# Patient Record
Sex: MALE | Race: WHITE | Employment: FULL TIME | URBAN - METROPOLITAN AREA
[De-identification: names, ages, dates, MRNs, and addresses within clinical notes are randomized per-mention and may not be internally consistent; named-entity substitution may affect disease eponyms.]

---

## 2017-06-16 PROBLEM — Z96.652 STATUS POST TOTAL LEFT KNEE REPLACEMENT: Status: ACTIVE | Noted: 2017-06-16

## 2018-11-15 ENCOUNTER — HOSPITAL ENCOUNTER (OUTPATIENT)
Dept: ULTRASOUND IMAGING | Age: 63
Discharge: HOME | End: 2018-11-15
Payer: COMMERCIAL

## 2018-11-15 DIAGNOSIS — R94.4 RENAL FUNCTION TEST ABNORMAL: ICD-10-CM

## 2018-11-15 DIAGNOSIS — R31.9 HEMATURIA, UNSPECIFIED TYPE: ICD-10-CM

## 2018-11-15 PROCEDURE — 76775 US EXAM ABDO BACK WALL LIM: CPT

## 2018-11-30 ENCOUNTER — HOSPITAL ENCOUNTER (OUTPATIENT)
Age: 63
Discharge: HOME | End: 2018-12-02
Payer: COMMERCIAL

## 2018-11-30 ENCOUNTER — HOSPITAL ENCOUNTER (OUTPATIENT)
Age: 63
Discharge: HOME | End: 2018-11-30
Payer: COMMERCIAL

## 2018-11-30 PROCEDURE — 88112 CYTOPATH CELL ENHANCE TECH: CPT

## 2019-04-26 ENCOUNTER — HOSPITAL ENCOUNTER (OUTPATIENT)
Age: 64
Discharge: HOME | End: 2019-04-28
Payer: COMMERCIAL

## 2019-04-26 PROCEDURE — 87088 URINE BACTERIA CULTURE: CPT

## 2019-04-28 LAB — URINE CULTURE, ROUTINE: NORMAL

## 2019-06-15 ENCOUNTER — PREP FOR PROCEDURE (OUTPATIENT)
Dept: UROLOGY | Age: 64
End: 2019-06-15

## 2019-06-15 RX ORDER — SODIUM CHLORIDE 0.9 % (FLUSH) 0.9 %
10 SYRINGE (ML) INJECTION EVERY 12 HOURS SCHEDULED
Status: CANCELLED | OUTPATIENT
Start: 2019-06-15

## 2019-06-15 RX ORDER — SODIUM CHLORIDE, SODIUM LACTATE, POTASSIUM CHLORIDE, CALCIUM CHLORIDE 600; 310; 30; 20 MG/100ML; MG/100ML; MG/100ML; MG/100ML
INJECTION, SOLUTION INTRAVENOUS CONTINUOUS
Status: CANCELLED | OUTPATIENT
Start: 2019-06-15

## 2019-06-15 RX ORDER — SODIUM CHLORIDE 0.9 % (FLUSH) 0.9 %
10 SYRINGE (ML) INJECTION PRN
Status: CANCELLED | OUTPATIENT
Start: 2019-06-15

## 2019-06-21 RX ORDER — TERAZOSIN 5 MG/1
5 CAPSULE ORAL NIGHTLY
COMMUNITY
End: 2022-02-11

## 2019-06-21 RX ORDER — TAMSULOSIN HYDROCHLORIDE 0.4 MG/1
0.4 CAPSULE ORAL DAILY
COMMUNITY
End: 2022-02-11

## 2019-06-21 NOTE — PROGRESS NOTES
Antonioliboriolal[address]aleks[address] PRE-ADMISSION TESTING GENERAL INSTRUCTIONS- [facility]-phone number:[phone]    GENERAL INSTRUCTIONS  [x] Antibacterial Soap shower Night before and/or AM of Surgery  [] Ras wipe instruction sheet and wipes given. [x] Nothing by mouth after midnight, including gum, candy, mints, or water. [x] You may brush your teeth, gargle, but do NOT swallow water. []Hibiclens shower  the night before and the morning of surgery. Do not use             Hibiclens on your face or head. [x]No smoking, chewing tobacco, illegal drugs, or alcohol within 24 hours of your surgery. [x] Jewelry, valuables or body piercing's should not be brought to the hospital. All body and/or tongue piercing's must be removed prior to arriving to hospital.  ALL hair pins must be removed. [x] Do not wear makeup, lotions, powders, deodorant. Nail polish as directed by the nurse. [x] Arrange transportation with a responsible adult  to and from the hospital. If you do not have a responsible adult  to transport you, you will need to make arrangements with a medical transportation company (i.e. [employer]. A Uber/taxi/bus is not appropriate unless you are accompanied by a responsible adult ). Arrange for someone to be with you for the remainder of the day and for 24 hours after your procedure due to having had anesthesia. Who will be your  for transportation?______MONTYA, WIFE____________   Who will be staying with you for 24 hrs after your procedure?____WIFE, MONTYA______________  [x] Bring insurance card and photo ID.  [] Transfusion Bracelet: Please bring with you to hospital, day of surgery  [] Bring urine specimen day of surgery. Any small container is acceptable. [] Use inhalers the morning of surgery and bring with you to hospital.  [] Bring copy of living will or healthcare power of  papers to be placed in your electronic record.   [] CPAP/BI-PAP: Please bring your may call the pre-op area if you have concerns about your blood sugar [phone]. [] Use your inhalers the morning of surgery. Bring your emergency inhaler with you day of surgery. [x] Follow physician instructions regarding any blood thinners you may be taking. WHAT TO EXPECT:  [x] The day of surgery you will be greeted and checked in by the [employer].  In addition, you will be registered in the [facility] by a Patient Access Representative. Please bring your photo ID and insurance card. A nurse will greet you in accordance to the time you are needed in the pre-op area to prepare you for surgery. Please do not be discouraged if you are not greeted in the order you arrive as there are many variables that are involved in patient preparation. Your patience is greatly appreciated as you wait for your nurse. Please bring in items such as: books, magazines, newspapers, electronics, or any other items  to occupy your time in the waiting area. [x]  Delays may occur with surgery and staff will make a sincere effort to keep you informed of delays. If any delays occur with your procedure, we apologize ahead of time for your inconvenience as we recognize the value of your time.

## 2019-06-26 ENCOUNTER — ANESTHESIA EVENT (OUTPATIENT)
Dept: OPERATING ROOM | Age: 64
End: 2019-06-26
Payer: COMMERCIAL

## 2019-06-27 ENCOUNTER — HOSPITAL ENCOUNTER (OUTPATIENT)
Age: 64
Setting detail: OUTPATIENT SURGERY
Discharge: HOME | End: 2019-06-27
Attending: UROLOGY | Admitting: UROLOGY
Payer: COMMERCIAL

## 2019-06-27 ENCOUNTER — APPOINTMENT (OUTPATIENT)
Dept: GENERAL RADIOLOGY | Age: 64
End: 2019-06-27
Attending: UROLOGY
Payer: COMMERCIAL

## 2019-06-27 ENCOUNTER — ANESTHESIA (OUTPATIENT)
Dept: OPERATING ROOM | Age: 64
End: 2019-06-27
Payer: COMMERCIAL

## 2019-06-27 VITALS
RESPIRATION RATE: 14 BRPM | DIASTOLIC BLOOD PRESSURE: 89 MMHG | HEART RATE: 50 BPM | BODY MASS INDEX: 32.58 KG/M2 | SYSTOLIC BLOOD PRESSURE: 135 MMHG | OXYGEN SATURATION: 95 % | TEMPERATURE: 98 F | HEIGHT: 69 IN | WEIGHT: 220 LBS

## 2019-06-27 VITALS — SYSTOLIC BLOOD PRESSURE: 102 MMHG | OXYGEN SATURATION: 97 % | DIASTOLIC BLOOD PRESSURE: 74 MMHG

## 2019-06-27 LAB
HCT VFR BLD CALC: 44.7 % (ref 37–54)
HEMOGLOBIN: 14.6 G/DL (ref 12.5–16.5)
MCH RBC QN AUTO: 29 PG (ref 26–35)
MCHC RBC AUTO-ENTMCNC: 32.7 % (ref 32–34.5)
MCV RBC AUTO: 88.9 FL (ref 80–99.9)
PDW BLD-RTO: 12.5 FL (ref 11.5–15)
PLATELET # BLD: 215 E9/L (ref 130–450)
PMV BLD AUTO: 10.1 FL (ref 7–12)
RBC # BLD: 5.03 E12/L (ref 3.8–5.8)
WBC # BLD: 6.9 E9/L (ref 4.5–11.5)

## 2019-06-27 PROCEDURE — 3700000001 HC ADD 15 MINUTES (ANESTHESIA): Performed by: UROLOGY

## 2019-06-27 PROCEDURE — 3600000005 HC SURGERY LEVEL 5 BASE: Performed by: UROLOGY

## 2019-06-27 PROCEDURE — 2500000003 HC RX 250 WO HCPCS: Performed by: NURSE ANESTHETIST, CERTIFIED REGISTERED

## 2019-06-27 PROCEDURE — 88112 CYTOPATH CELL ENHANCE TECH: CPT

## 2019-06-27 PROCEDURE — 6360000002 HC RX W HCPCS: Performed by: NURSE ANESTHETIST, CERTIFIED REGISTERED

## 2019-06-27 PROCEDURE — C1771 REP DEV, URINARY, W/SLING: HCPCS | Performed by: UROLOGY

## 2019-06-27 PROCEDURE — 7100000011 HC PHASE II RECOVERY - ADDTL 15 MIN: Performed by: UROLOGY

## 2019-06-27 PROCEDURE — 87186 SC STD MICRODIL/AGAR DIL: CPT

## 2019-06-27 PROCEDURE — 2580000003 HC RX 258: Performed by: UROLOGY

## 2019-06-27 PROCEDURE — 7100000001 HC PACU RECOVERY - ADDTL 15 MIN: Performed by: UROLOGY

## 2019-06-27 PROCEDURE — 7100000000 HC PACU RECOVERY - FIRST 15 MIN: Performed by: UROLOGY

## 2019-06-27 PROCEDURE — 87077 CULTURE AEROBIC IDENTIFY: CPT

## 2019-06-27 PROCEDURE — 87088 URINE BACTERIA CULTURE: CPT

## 2019-06-27 PROCEDURE — 3700000000 HC ANESTHESIA ATTENDED CARE: Performed by: UROLOGY

## 2019-06-27 PROCEDURE — 2580000003 HC RX 258: Performed by: NURSE ANESTHETIST, CERTIFIED REGISTERED

## 2019-06-27 PROCEDURE — 3600000015 HC SURGERY LEVEL 5 ADDTL 15MIN: Performed by: UROLOGY

## 2019-06-27 PROCEDURE — 36415 COLL VENOUS BLD VENIPUNCTURE: CPT

## 2019-06-27 PROCEDURE — 7100000010 HC PHASE II RECOVERY - FIRST 15 MIN: Performed by: UROLOGY

## 2019-06-27 PROCEDURE — C1771 REP DEV, URINARY, W/SLING: HCPCS

## 2019-06-27 PROCEDURE — 6360000002 HC RX W HCPCS: Performed by: UROLOGY

## 2019-06-27 PROCEDURE — 2709999900 HC NON-CHARGEABLE SUPPLY: Performed by: UROLOGY

## 2019-06-27 PROCEDURE — 74420 UROGRAPHY RTRGR +-KUB: CPT

## 2019-06-27 PROCEDURE — 85027 COMPLETE CBC AUTOMATED: CPT

## 2019-06-27 PROCEDURE — C1758 CATHETER, URETERAL: HCPCS | Performed by: UROLOGY

## 2019-06-27 DEVICE — SYSTEM URO W/ IMPL DEL DEV FOR TREAT OF URIN OUTFLO: Type: IMPLANTABLE DEVICE | Site: PROSTATE | Status: FUNCTIONAL

## 2019-06-27 RX ORDER — PROPOFOL 10 MG/ML
INJECTION, EMULSION INTRAVENOUS PRN
Status: DISCONTINUED | OUTPATIENT
Start: 2019-06-27 | End: 2019-06-27 | Stop reason: SDUPTHER

## 2019-06-27 RX ORDER — MORPHINE SULFATE 2 MG/ML
2 INJECTION, SOLUTION INTRAMUSCULAR; INTRAVENOUS EVERY 5 MIN PRN
Status: DISCONTINUED | OUTPATIENT
Start: 2019-06-27 | End: 2019-06-27 | Stop reason: HOSPADM

## 2019-06-27 RX ORDER — GLYCOPYRROLATE 1 MG/5 ML
SYRINGE (ML) INTRAVENOUS PRN
Status: DISCONTINUED | OUTPATIENT
Start: 2019-06-27 | End: 2019-06-27 | Stop reason: SDUPTHER

## 2019-06-27 RX ORDER — ONDANSETRON 2 MG/ML
INJECTION INTRAMUSCULAR; INTRAVENOUS PRN
Status: DISCONTINUED | OUTPATIENT
Start: 2019-06-27 | End: 2019-06-27 | Stop reason: SDUPTHER

## 2019-06-27 RX ORDER — SODIUM CHLORIDE, SODIUM LACTATE, POTASSIUM CHLORIDE, CALCIUM CHLORIDE 600; 310; 30; 20 MG/100ML; MG/100ML; MG/100ML; MG/100ML
INJECTION, SOLUTION INTRAVENOUS CONTINUOUS
Status: DISCONTINUED | OUTPATIENT
Start: 2019-06-27 | End: 2019-06-27 | Stop reason: HOSPADM

## 2019-06-27 RX ORDER — SODIUM CHLORIDE 0.9 % (FLUSH) 0.9 %
10 SYRINGE (ML) INJECTION PRN
Status: DISCONTINUED | OUTPATIENT
Start: 2019-06-27 | End: 2019-06-27 | Stop reason: HOSPADM

## 2019-06-27 RX ORDER — LIDOCAINE HYDROCHLORIDE 20 MG/ML
INJECTION, SOLUTION INTRAVENOUS PRN
Status: DISCONTINUED | OUTPATIENT
Start: 2019-06-27 | End: 2019-06-27 | Stop reason: SDUPTHER

## 2019-06-27 RX ORDER — PROMETHAZINE HYDROCHLORIDE 25 MG/ML
6.25 INJECTION, SOLUTION INTRAMUSCULAR; INTRAVENOUS
Status: DISCONTINUED | OUTPATIENT
Start: 2019-06-27 | End: 2019-06-27 | Stop reason: HOSPADM

## 2019-06-27 RX ORDER — MEPERIDINE HYDROCHLORIDE 50 MG/ML
12.5 INJECTION INTRAMUSCULAR; INTRAVENOUS; SUBCUTANEOUS EVERY 5 MIN PRN
Status: DISCONTINUED | OUTPATIENT
Start: 2019-06-27 | End: 2019-06-27 | Stop reason: HOSPADM

## 2019-06-27 RX ORDER — SODIUM CHLORIDE 9 MG/ML
INJECTION, SOLUTION INTRAVENOUS CONTINUOUS PRN
Status: DISCONTINUED | OUTPATIENT
Start: 2019-06-27 | End: 2019-06-27 | Stop reason: SDUPTHER

## 2019-06-27 RX ORDER — HYDRALAZINE HYDROCHLORIDE 20 MG/ML
5 INJECTION INTRAMUSCULAR; INTRAVENOUS EVERY 10 MIN PRN
Status: DISCONTINUED | OUTPATIENT
Start: 2019-06-27 | End: 2019-06-27 | Stop reason: HOSPADM

## 2019-06-27 RX ORDER — MIDAZOLAM HYDROCHLORIDE 1 MG/ML
INJECTION INTRAMUSCULAR; INTRAVENOUS PRN
Status: DISCONTINUED | OUTPATIENT
Start: 2019-06-27 | End: 2019-06-27 | Stop reason: SDUPTHER

## 2019-06-27 RX ORDER — SODIUM CHLORIDE 0.9 % (FLUSH) 0.9 %
10 SYRINGE (ML) INJECTION EVERY 12 HOURS SCHEDULED
Status: DISCONTINUED | OUTPATIENT
Start: 2019-06-27 | End: 2019-06-27 | Stop reason: HOSPADM

## 2019-06-27 RX ORDER — DEXAMETHASONE SODIUM PHOSPHATE 10 MG/ML
INJECTION INTRAMUSCULAR; INTRAVENOUS PRN
Status: DISCONTINUED | OUTPATIENT
Start: 2019-06-27 | End: 2019-06-27 | Stop reason: SDUPTHER

## 2019-06-27 RX ORDER — LABETALOL HYDROCHLORIDE 5 MG/ML
5 INJECTION, SOLUTION INTRAVENOUS EVERY 10 MIN PRN
Status: DISCONTINUED | OUTPATIENT
Start: 2019-06-27 | End: 2019-06-27 | Stop reason: HOSPADM

## 2019-06-27 RX ORDER — FENTANYL CITRATE 50 UG/ML
INJECTION, SOLUTION INTRAMUSCULAR; INTRAVENOUS PRN
Status: DISCONTINUED | OUTPATIENT
Start: 2019-06-27 | End: 2019-06-27 | Stop reason: SDUPTHER

## 2019-06-27 RX ADMIN — MIDAZOLAM HYDROCHLORIDE 2 MG: 1 INJECTION, SOLUTION INTRAMUSCULAR; INTRAVENOUS at 08:06

## 2019-06-27 RX ADMIN — DEXAMETHASONE SODIUM PHOSPHATE 10 MG: 10 INJECTION INTRAMUSCULAR; INTRAVENOUS at 08:24

## 2019-06-27 RX ADMIN — SODIUM CHLORIDE: 9 INJECTION, SOLUTION INTRAVENOUS at 08:09

## 2019-06-27 RX ADMIN — LIDOCAINE HYDROCHLORIDE 50 MG: 20 INJECTION, SOLUTION INTRAVENOUS at 08:12

## 2019-06-27 RX ADMIN — Medication 0.2 MG: at 08:10

## 2019-06-27 RX ADMIN — SODIUM CHLORIDE, POTASSIUM CHLORIDE, SODIUM LACTATE AND CALCIUM CHLORIDE: 600; 310; 30; 20 INJECTION, SOLUTION INTRAVENOUS at 06:33

## 2019-06-27 RX ADMIN — PROPOFOL 200 MG: 10 INJECTION, EMULSION INTRAVENOUS at 08:11

## 2019-06-27 RX ADMIN — ONDANSETRON HYDROCHLORIDE 4 MG: 2 INJECTION, SOLUTION INTRAMUSCULAR; INTRAVENOUS at 08:23

## 2019-06-27 RX ADMIN — Medication 2 G: at 08:13

## 2019-06-27 RX ADMIN — FENTANYL CITRATE 100 MCG: 50 INJECTION, SOLUTION INTRAMUSCULAR; INTRAVENOUS at 08:10

## 2019-06-27 ASSESSMENT — PAIN SCALES - GENERAL
PAINLEVEL_OUTOF10: 0
PAINLEVEL_OUTOF10: 0

## 2019-06-27 ASSESSMENT — PULMONARY FUNCTION TESTS
PIF_VALUE: 1
PIF_VALUE: 1
PIF_VALUE: 3
PIF_VALUE: 19
PIF_VALUE: 6
PIF_VALUE: 4
PIF_VALUE: 22
PIF_VALUE: 3
PIF_VALUE: 6
PIF_VALUE: 6
PIF_VALUE: 4
PIF_VALUE: 2
PIF_VALUE: 3
PIF_VALUE: 2
PIF_VALUE: 3
PIF_VALUE: 3
PIF_VALUE: 2
PIF_VALUE: 3
PIF_VALUE: 3
PIF_VALUE: 1
PIF_VALUE: 3
PIF_VALUE: 3
PIF_VALUE: 1
PIF_VALUE: 4
PIF_VALUE: 4
PIF_VALUE: 3
PIF_VALUE: 5
PIF_VALUE: 4
PIF_VALUE: 3
PIF_VALUE: 4
PIF_VALUE: 4

## 2019-06-27 NOTE — OP NOTE
[address]                  [address] [facility]  [facility]                                OPERATIVE REPORT    PATIENT NAME: Addie King                    :        1955  MED REC NO:   [id number]                            ROOM:  ACCOUNT NO:   [de-identified]                           ADMIT DATE: 2019  PROVIDER:     Eric Solano MD    DATE OF PROCEDURE:  2019    PREOPERATIVE DIAGNOSIS:  BPH with history of hematuria. POSTOPERATIVE DIAGNOSIS:  BPH with history of hematuria. OPERATIONS:  Cystopanendoscopy, retrograde pyelogram, urolith procedure. SURGEON:  Eric Solano M.D. ANESTHESIA:  LMAC. OPERATION REPORT:  With the patient in the lithotomy position under  satisfactory sedation, the genitalia were prepped and draped in a  sterile fashion. A 21-Brazilian panendoscope passed easily through the  pendulous and membranous urethra. There were no strictures or lesions  seen. The prostatic fossa showed predominantly lateral lobe  obstruction. There was a very short prostatic fossa. Upon entering the  bladder, the bladder showed trabeculation 1 to 2+. The mucosal pattern  appeared to be normal.  Urine was sent for cytology. The ureteral  orifices were of normal configuration and location. The retrograde on  the right side proved to be unremarkable. On the left side, there  appeared to be a dilatation in the distal ureter with what appeared to  be an area that did not expand on instillation of contrast; however, the  open-ended catheter passed easily beyond this point. The collecting  system did not show significant dilatation. 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  The ureter  did drain satisfactorily. Following this, using the urolith device, I  deployed two devices one on each side with excellent opening of the  bladder neck. The bladder was drained.   The patient was sent to the  recovery room in satisfactory condition.         Aniyah Fine MD    D: 06/27/2019 8:52:34       T: 06/27/2019 9:00:45     [email or web address]  Job#: [id number]     Doc#: [id number]    CC:

## 2019-06-27 NOTE — ANESTHESIA PRE PROCEDURE
Department of Anesthesiology  Preprocedure Note       Name:  Joelle Manuel   Age:  59 y.o.  :  1955                                          MRN:  [id number]         Date:  2019      Surgeon: Rachel Matson):  Bailee Noland MD    Procedure: CYSTOSCOPY URETHROSCOPY WITH INSERTION OF PERMANENT ADJUSTABLE TRANS PROSTATIC IMPLANT (N/A )    Medications prior to admission:   Prior to Admission medications    Medication Sig Start Date End Date Taking?  Authorizing Provider   tamsulosin (FLOMAX) 0.4 MG capsule Take 0.4 mg by mouth daily   Yes Historical Provider, MD   terazosin (HYTRIN) 5 MG capsule Take 5 mg by mouth nightly   Yes Historical Provider, MD   ZETIA 10 MG tablet TAKE ONE TABLET BY MOUTH EVERY DAY 17  Yes Historical Provider, MD   amLODIPine (NORVASC) 5 MG tablet Take 5 mg by mouth nightly  16  Yes Historical Provider, MD   atenolol (TENORMIN) 50 MG tablet Take 50 mg by mouth nightly  16  Yes Historical Provider, MD   sulindac (CLINORIL) 200 MG tablet Take 200 mg by mouth 2 times daily  16   Historical Provider, MD       Current medications:    Current Facility-Administered Medications   Medication Dose Route Frequency Provider Last Rate Last Dose    ceFAZolin (ANCEF) 2 g in dextrose 5 % 50 mL IVPB  2 g Intravenous See Admin Instructions Bailee Noland MD        lactated ringers infusion   Intravenous Continuous Derik Holloway  mL/hr at 19 7137      sodium chloride flush 0.9 % injection 10 mL  10 mL Intravenous 2 times per day Bailee Noland MD        sodium chloride flush 0.9 % injection 10 mL  10 mL Intravenous PRN Bailee Noland MD           Allergies:  No Known Allergies    Problem List:    Patient Active Problem List   Diagnosis Code    Primary osteoarthritis of left knee M17.12    Medial meniscus tear S83.249A    Hypertension I10    Status post total left knee replacement F16.727       Past Medical History:        Diagnosis Date    Component Value Date    PROTIME 10.3 06/02/2016    INR 1.0 06/02/2016    APTT 29.8 06/02/2016       HCG (If Applicable): No results found for: PREGTESTUR, PREGSERUM, HCG, HCGQUANT     ABGs: No results found for: PHART, PO2ART, XGF2JAT, ZWS7WDS, BEART, O4OSRGCU     Type & Screen (If Applicable):  No results found for: LABABO, [address]    Anesthesia Evaluation  Patient summary reviewed and Nursing notes reviewed  Airway: Mallampati: II  TM distance: >3 FB   Neck ROM: limited  Mouth opening: > = 3 FB Dental: normal exam         Pulmonary: breath sounds clear to auscultation  (+) sleep apnea: on CPAP,            Patient did not smoke on day of surgery. Cardiovascular:  Exercise tolerance: good (>4 METS),   (+) hypertension: moderate,         Rhythm: regular  Rate: normal                    Neuro/Psych:   Negative Neuro/Psych ROS              GI/Hepatic/Renal: Neg GI/Hepatic/Renal ROS            Endo/Other: Negative Endo/Other ROS                    Abdominal:       Abdomen: soft. Vascular:                                        Anesthesia Plan      general     ASA 3       Induction: intravenous. MIPS: Postoperative opioids intended and Prophylactic antiemetics administered. Anesthetic plan and risks discussed with patient. Plan discussed with CRNA.     Attending anesthesiologist reviewed and agrees with [address] - JAMES   6/27/2019

## 2019-06-27 NOTE — BRIEF OP NOTE
Brief Postoperative Note    Katrin Hernadez  YOB: 1955  [id number]    Pre-operative Diagnosis: bph hx hematuria    Post-operative Diagnosis: Same    Procedure: cysto retro urolift  procedure    Anesthesia: MAC    Surgeons/Assistants: Shira Holloway      Estimated Blood Loss: less than 50     Complications: None    Specimens: Was Not Obtained    Findings:       Electronically signed by Grupo Tony MD on 6/27/2019 at 8:40 AM

## 2019-06-27 NOTE — PROGRESS NOTES
Patient arrived to [facility] and is up to the bathroom. Patient refused to have staff wait in the bathroom with him. Patient instructed that if he feels dizzy or lightheaded to sit and pull the emergency cord and wait for help to arrive.

## 2019-06-27 NOTE — PROGRESS NOTES
Patient up to restroom at this time. Refused to have nurse wait in bathroom. Instructed to pull emergency cord and stay seated if he feels lightheaded or dizzy.

## 2019-06-29 LAB
ORGANISM: ABNORMAL
URINE CULTURE, ROUTINE: ABNORMAL
URINE CULTURE, ROUTINE: ABNORMAL

## 2019-08-18 ENCOUNTER — HOSPITAL ENCOUNTER (EMERGENCY)
Age: 64
Discharge: HOME | End: 2019-08-18
Payer: COMMERCIAL

## 2019-08-18 ENCOUNTER — APPOINTMENT (OUTPATIENT)
Dept: CT IMAGING | Age: 64
End: 2019-08-18
Payer: COMMERCIAL

## 2019-08-18 VITALS
OXYGEN SATURATION: 96 % | RESPIRATION RATE: 16 BRPM | WEIGHT: 215 LBS | HEIGHT: 69 IN | BODY MASS INDEX: 31.84 KG/M2 | DIASTOLIC BLOOD PRESSURE: 96 MMHG | SYSTOLIC BLOOD PRESSURE: 151 MMHG | HEART RATE: 54 BPM | TEMPERATURE: 98.1 F

## 2019-08-18 DIAGNOSIS — M54.32 BILATERAL SCIATICA: ICD-10-CM

## 2019-08-18 DIAGNOSIS — N20.0 KIDNEY STONE: ICD-10-CM

## 2019-08-18 DIAGNOSIS — G89.29 ACUTE EXACERBATION OF CHRONIC LOW BACK PAIN: Primary | ICD-10-CM

## 2019-08-18 DIAGNOSIS — M54.50 ACUTE EXACERBATION OF CHRONIC LOW BACK PAIN: Primary | ICD-10-CM

## 2019-08-18 DIAGNOSIS — M54.31 BILATERAL SCIATICA: ICD-10-CM

## 2019-08-18 PROCEDURE — 99283 EMERGENCY DEPT VISIT LOW MDM: CPT

## 2019-08-18 PROCEDURE — 6360000002 HC RX W HCPCS: Performed by: PHYSICIAN ASSISTANT

## 2019-08-18 PROCEDURE — 96372 THER/PROPH/DIAG INJ SC/IM: CPT

## 2019-08-18 PROCEDURE — 72131 CT LUMBAR SPINE W/O DYE: CPT

## 2019-08-18 RX ORDER — DEXAMETHASONE SODIUM PHOSPHATE 10 MG/ML
10 INJECTION INTRAMUSCULAR; INTRAVENOUS ONCE
Status: COMPLETED | OUTPATIENT
Start: 2019-08-18 | End: 2019-08-18

## 2019-08-18 RX ORDER — ORPHENADRINE CITRATE 30 MG/ML
60 INJECTION INTRAMUSCULAR; INTRAVENOUS ONCE
Status: COMPLETED | OUTPATIENT
Start: 2019-08-18 | End: 2019-08-18

## 2019-08-18 RX ORDER — IBUPROFEN 800 MG/1
800 TABLET ORAL 2 TIMES DAILY PRN
Qty: 60 TABLET | Refills: 0 | Status: SHIPPED | OUTPATIENT
Start: 2019-08-18 | End: 2022-09-26

## 2019-08-18 RX ORDER — PREDNISONE 20 MG/1
40 TABLET ORAL DAILY
Qty: 10 TABLET | Refills: 0 | Status: SHIPPED | OUTPATIENT
Start: 2019-08-18 | End: 2019-08-23

## 2019-08-18 RX ORDER — KETOROLAC TROMETHAMINE 30 MG/ML
30 INJECTION, SOLUTION INTRAMUSCULAR; INTRAVENOUS ONCE
Status: COMPLETED | OUTPATIENT
Start: 2019-08-18 | End: 2019-08-18

## 2019-08-18 RX ORDER — CYCLOBENZAPRINE HCL 10 MG
10 TABLET ORAL 3 TIMES DAILY PRN
Qty: 10 TABLET | Refills: 0 | Status: SHIPPED | OUTPATIENT
Start: 2019-08-18 | End: 2019-08-28

## 2019-08-18 RX ADMIN — DEXAMETHASONE SODIUM PHOSPHATE 10 MG: 10 INJECTION INTRAMUSCULAR; INTRAVENOUS at 12:13

## 2019-08-18 RX ADMIN — ORPHENADRINE CITRATE 60 MG: 30 INJECTION INTRAMUSCULAR; INTRAVENOUS at 12:12

## 2019-08-18 RX ADMIN — KETOROLAC TROMETHAMINE 30 MG: 30 INJECTION, SOLUTION INTRAMUSCULAR at 12:12

## 2019-08-18 ASSESSMENT — PAIN DESCRIPTION - PAIN TYPE: TYPE: ACUTE PAIN

## 2019-08-18 ASSESSMENT — PAIN SCALES - GENERAL
PAINLEVEL_OUTOF10: 9
PAINLEVEL_OUTOF10: 0

## 2019-08-18 ASSESSMENT — PAIN DESCRIPTION - LOCATION: LOCATION: BACK

## 2019-08-18 ASSESSMENT — PAIN DESCRIPTION - FREQUENCY: FREQUENCY: CONTINUOUS

## 2019-08-18 ASSESSMENT — PAIN DESCRIPTION - DESCRIPTORS: DESCRIPTORS: SHARP

## 2019-08-18 ASSESSMENT — PAIN DESCRIPTION - ORIENTATION: ORIENTATION: LEFT;RIGHT

## 2019-08-18 NOTE — ED PROVIDER NOTES
inflammation in his back. Patient will follow-up with his family doctor. .At this time the patient is without objective evidence of an acute process requiring inpatient management. Patient was explicitly instructed on specific signs and symptoms on which to return to the emergency room for. Patient was instructed to return to the ER for any new or worsening symptoms. Additional discharge instructions were given verbally. All questions were answered. Patient is comfortable and agreeable with discharge plan. Patient in no acute distress and non-toxic in appearance. At this time the patient is without objective evidence of an acute process requiring hospitalization or inpatient management. They have remained hemodynamically stable throughout their entire ED visit and are stable for discharge with outpatient follow-up. The plan has been discussed in detail and they are aware of the specific conditions for emergent return, as well as the importance of follow-up. Counseling: The emergency provider has spoken with the patient and discussed todays results, in addition to providing specific details for the plan of care and counseling regarding the diagnosis and prognosis. Questions are answered at this time and they are agreeable with the plan. Assessment      1. Acute exacerbation of chronic low back pain    2. Bilateral sciatica    3. Kidney stone      Plan   Discharge to home  Patient condition is stable    New Medications     New Prescriptions    CYCLOBENZAPRINE (FLEXERIL) 10 MG TABLET    Take 1 tablet by mouth 3 times daily as needed for Muscle spasms Cannot drive with this medication    IBUPROFEN (ADVIL;MOTRIN) 800 MG TABLET    Take 1 tablet by mouth 2 times daily as needed for Pain    PREDNISONE (DELTASONE) 20 MG TABLET    Take 2 tablets by mouth daily for 5 days     Electronically signed by Moshe Storm PA-C   DD: 8/18/19  **This report was transcribed using voice recognition software. Every effort was made to ensure accuracy; however, inadvertent computerized transcription errors may be present.   END OF ED PROVIDER NOTE        Lupillo Romero PA-C  08/18/19 [id number]

## 2020-07-21 ENCOUNTER — OFFICE VISIT (OUTPATIENT)
Dept: ORTHOPEDIC SURGERY | Age: 65
End: 2020-07-21
Payer: COMMERCIAL

## 2020-07-21 VITALS — HEIGHT: 69 IN | BODY MASS INDEX: 33.33 KG/M2 | TEMPERATURE: 98 F | WEIGHT: 225 LBS

## 2020-07-21 PROCEDURE — 99203 OFFICE O/P NEW LOW 30 MIN: CPT | Performed by: ORTHOPAEDIC SURGERY

## 2020-07-21 RX ORDER — METHYLPREDNISOLONE 4 MG/1
4 TABLET ORAL SEE ADMIN INSTRUCTIONS
Qty: 1 KIT | Refills: 0 | Status: SHIPPED | OUTPATIENT
Start: 2020-07-21 | End: 2020-07-27

## 2020-07-21 NOTE — PROGRESS NOTES
Chief Complaint   Patient presents with    Knee Pain     Left TKA, DOS 6/8/2016, states of sharp pain posterior to patella     Chief Complaint   Patient presents with    Knee Pain     Left TKA, DOS 6/8/2016, states of sharp pain posterior to patella       Gianna Flores returns today for follow-up of his left TKA. DOS: 6/8/2016. He reports this is worse than when I saw the patient last.      Past Medical History:   Diagnosis Date    Arthritis     Hypertension     Sleep apnea     has cpap     Past Surgical History:   Procedure Laterality Date    APPENDECTOMY      COLONOSCOPY  2010    EYE SURGERY      laser surgery corrective    HERNIA REPAIR  2001    abd hiatal hernia ?     JOINT REPLACEMENT Left 6/8/16    knee    PROSTHET DEVICE APPLICATION N/A 6/59/2334    CYSTOSCOPY RETROGRADE PYELOGRAM, URETHROSCOPY WITH INSERTION OF PERMANENT ADJUSTABLE TRANS PROSTATIC IMPLANT performed by Fernando Ambriz MD at [place] [address] Left     bicep surgery       Current Outpatient Medications:     ibuprofen (ADVIL;MOTRIN) 800 MG tablet, Take 1 tablet by mouth 2 times daily as needed for Pain, Disp: 60 tablet, Rfl: 0    tamsulosin (FLOMAX) 0.4 MG capsule, Take 0.4 mg by mouth daily, Disp: , Rfl:     terazosin (HYTRIN) 5 MG capsule, Take 5 mg by mouth nightly, Disp: , Rfl:     ZETIA 10 MG tablet, TAKE ONE TABLET BY MOUTH EVERY DAY, Disp: , Rfl: 5    amLODIPine (NORVASC) 5 MG tablet, Take 5 mg by mouth nightly , Disp: , Rfl:     atenolol (TENORMIN) 50 MG tablet, Take 50 mg by mouth nightly , Disp: , Rfl:     sulindac (CLINORIL) 200 MG tablet, Take 200 mg by mouth 2 times daily , Disp: , Rfl:   No Known Allergies  Social History     Socioeconomic History    Marital status:      Spouse name: Not on file    Number of children: Not on file    Years of education: Not on file    Highest education level: Not on file   Occupational History    Not on file   Social Needs    Financial resource strain: Not on file    Food insecurity     Worry: Not on file     Inability: Not on file    Transportation needs     Medical: Not on file     Non-medical: Not on file   Tobacco Use    Smoking status: Never Smoker    Smokeless tobacco: Never Used   Substance and Sexual Activity    Alcohol use: Yes     Alcohol/week: 1.0 standard drinks     Types: 1 Shots of liquor per week     Comment: week    Drug use: No    Sexual activity: Not on file   Lifestyle    Physical activity     Days per week: Not on file     Minutes per session: Not on file    Stress: Not on file   Relationships    Social connections     Talks on phone: Not on file     Gets together: Not on file     Attends Jew service: Not on file     Active member of club or organization: Not on file     Attends meetings of clubs or organizations: Not on file     Relationship status: Not on file    Intimate partner violence     Fear of current or ex partner: Not on file     Emotionally abused: Not on file     Physically abused: Not on file     Forced sexual activity: Not on file   Other Topics Concern    Not on file   Social History Narrative    Not on file       Review of Systems:     Skin: (-) rash,(-) psoriasis,(-) eczema, (-)skin cancer. Musculoskeletal: (-) fractures,  (-) dislocations,(-) collagen vascular disease, (-) fibromyalgia, (-) multiple sclerosis, (-) muscular dystrophy, (-) RSD,(-) joint pain (-)swelling, (-) joint pain,swelling. Neurologic: (-) epilepsy, (-)seizures,(-) brain tumor,(-) TIA, (-)stroke, (-)headaches, (-)Parkinson disease,(-) memory loss, (-) LOC. Cardiovascular: (-) Chest pain, (-) swelling in legs/feet, (-) SOB, (-) cramping in legs/feet with walking. Subjective:    Constitutional:    The patient is alert and oriented x 3, appears to be stated age and in no distress. Temp 98 °F (36.7 °C)   Ht 5' 9\" (1.753 m)   Wt 225 lb (102.1 kg)   BMI 33.23 kg/m²     Skin:  Upon inspection: the skin appears warm, dry and intact. There is a previous scar over the affected area. There is not any cellulitis, lymphedema or cutaneous lesions noted in the lower extremities. Upon palpation there is no induration noted. Neurologic:  Gait: antalgic; Motor exam of the lower extremities show ; quadriceps, hamstrings, foot dorsi and plantar flexors intact R.  5/5 and L. 5/5. Deep tendon reflexes are 2/4 at the knees and 2/4 at the ankles with strong extensor hallicus longus motor strength bilaterally. Sensory to both feet is intact to all sensory roots. Cardiovascular: The vascular exam is normal and is well perfused to distal extremities. Distal pulses DP/PT: R. 2+; L. 2+. There is cap refill noted less than two seconds in all digits. There is not edema of the bilateral lower extremities. There is not varicosities noted in the distal extremities. Lymph:  Upon palpation,  there is no lymphadenopathy noted in bilateral lower extremities. Musculoskeletal:    Lumbar exam:  On visual inspection, there is not deformity of the spine. full range of motion, no tenderness, palpable spasm or pain on motion. Special tests: Straight Leg Raise negative, Una testnegative. Hip exam:  Upon inspection, there is not deformity noted. Upon palpation there is not tenderness. ROM: is   full and symmetrical.   Strength: Hip Flexors 5/5; Hip Abductors 5/5; Hip Adduction 5/5. Knee exam:  Left knee exam shows;  range of motion of R. Knee is 0 to 120, and L. Knee is 0 to 120. The patient does have  pain on motion, effusion is none, there is tenderness over the  patellar tendon region, there are not any masses, there is not ligamentous instability, there is not  deformity noted. Knee exam: neither positive for moderate crepitations, some mild tenderness laxity is not noted with  stress.       R. Knee:  Lachman's negative, Anterior Drawer negative, Posterior Drawer negative  Jeana's negative, Thallasy  negative,   PF grind test negative,

## 2021-11-22 ENCOUNTER — TELEPHONE (OUTPATIENT)
Dept: CARDIOLOGY CLINIC | Age: 66
End: 2021-11-22

## 2021-11-22 NOTE — TELEPHONE ENCOUNTER
Left message to schedule first available with Dr. Tiffanie Moran new patient referral  Dr. Tan Nicely request Dr. Tiffanie Moran only

## 2022-02-10 LAB
ALBUMIN SERPL-MCNC: NORMAL G/DL
ALP BLD-CCNC: NORMAL U/L
ALT SERPL-CCNC: NORMAL U/L
ANION GAP SERPL CALCULATED.3IONS-SCNC: NORMAL MMOL/L
AST SERPL-CCNC: NORMAL U/L
AVERAGE GLUCOSE: 126
BASOPHILS ABSOLUTE: NORMAL
BASOPHILS RELATIVE PERCENT: NORMAL
BILIRUB SERPL-MCNC: NORMAL MG/DL
BUN BLDV-MCNC: NORMAL MG/DL
CALCIUM SERPL-MCNC: NORMAL MG/DL
CHLORIDE BLD-SCNC: NORMAL MMOL/L
CHOLESTEROL, TOTAL: NORMAL
CHOLESTEROL/HDL RATIO: NORMAL
CO2: NORMAL
CREAT SERPL-MCNC: NORMAL MG/DL
EOSINOPHILS ABSOLUTE: NORMAL
EOSINOPHILS RELATIVE PERCENT: NORMAL
GFR CALCULATED: NORMAL
GLUCOSE BLD-MCNC: NORMAL MG/DL
HBA1C MFR BLD: 6 %
HCT VFR BLD CALC: NORMAL %
HDLC SERPL-MCNC: NORMAL MG/DL
HEMOGLOBIN: NORMAL
LDL CHOLESTEROL CALCULATED: NORMAL
LYMPHOCYTES ABSOLUTE: NORMAL
LYMPHOCYTES RELATIVE PERCENT: NORMAL
MCH RBC QN AUTO: NORMAL PG
MCHC RBC AUTO-ENTMCNC: NORMAL G/DL
MCV RBC AUTO: NORMAL FL
MONOCYTES ABSOLUTE: NORMAL
MONOCYTES RELATIVE PERCENT: NORMAL
NEUTROPHILS ABSOLUTE: NORMAL
NEUTROPHILS RELATIVE PERCENT: NORMAL
NONHDLC SERPL-MCNC: NORMAL MG/DL
PLATELET # BLD: NORMAL 10*3/UL
PMV BLD AUTO: NORMAL FL
POTASSIUM SERPL-SCNC: NORMAL MMOL/L
RBC # BLD: NORMAL 10*6/UL
SODIUM BLD-SCNC: NORMAL MMOL/L
TOTAL PROTEIN: NORMAL
TRIGL SERPL-MCNC: NORMAL MG/DL
VLDLC SERPL CALC-MCNC: NORMAL MG/DL
WBC # BLD: NORMAL 10*3/UL

## 2022-02-11 ENCOUNTER — OFFICE VISIT (OUTPATIENT)
Dept: CARDIOLOGY CLINIC | Age: 67
End: 2022-02-11
Payer: MEDICARE

## 2022-02-11 VITALS
HEART RATE: 53 BPM | OXYGEN SATURATION: 98 % | HEIGHT: 68 IN | SYSTOLIC BLOOD PRESSURE: 180 MMHG | RESPIRATION RATE: 18 BRPM | BODY MASS INDEX: 34.07 KG/M2 | WEIGHT: 224.8 LBS | DIASTOLIC BLOOD PRESSURE: 100 MMHG

## 2022-02-11 DIAGNOSIS — I10 PRIMARY HYPERTENSION: ICD-10-CM

## 2022-02-11 DIAGNOSIS — R94.39 ABNORMAL NUCLEAR STRESS TEST: Primary | ICD-10-CM

## 2022-02-11 DIAGNOSIS — R06.83 SNORING: ICD-10-CM

## 2022-02-11 DIAGNOSIS — R06.09 DOE (DYSPNEA ON EXERTION): ICD-10-CM

## 2022-02-11 PROCEDURE — 99205 OFFICE O/P NEW HI 60 MIN: CPT | Performed by: INTERNAL MEDICINE

## 2022-02-11 PROCEDURE — 93000 ELECTROCARDIOGRAM COMPLETE: CPT | Performed by: INTERNAL MEDICINE

## 2022-02-11 RX ORDER — ASPIRIN 81 MG/1
81 TABLET, CHEWABLE ORAL DAILY
Qty: 90 TABLET | Refills: 3 | Status: SHIPPED
Start: 2022-02-11 | End: 2022-09-12

## 2022-02-11 RX ORDER — AMLODIPINE BESYLATE 10 MG/1
10 TABLET ORAL DAILY
Qty: 90 TABLET | Refills: 3 | Status: SHIPPED
Start: 2022-02-11 | End: 2022-06-21 | Stop reason: ALTCHOICE

## 2022-02-11 RX ORDER — HYDROCODONE BITARTRATE AND ACETAMINOPHEN 5; 325 MG/1; MG/1
1 TABLET ORAL EVERY 6 HOURS PRN
COMMUNITY
End: 2022-09-26

## 2022-02-11 RX ORDER — TIZANIDINE 2 MG/1
2 TABLET ORAL EVERY 6 HOURS PRN
COMMUNITY
End: 2022-09-12

## 2022-02-11 RX ORDER — FLUTICASONE PROPIONATE 50 MCG
1 SPRAY, SUSPENSION (ML) NASAL DAILY PRN
COMMUNITY
End: 2022-09-26

## 2022-02-11 NOTE — PATIENT INSTRUCTIONS
1. We will call you to schedule a heart cath  2. Follow up with Dr. Jaime Barrera for the blood in the urine  3. Fasting blood work just before the heart cath  4. Start aspirin 81 mg daily  5. Increase amlodipine to 10 mg daily - bring in a blood pressure log with you next time - check 3 times a week at least  6. Sleep study  7.  Return in 2 months

## 2022-02-11 NOTE — PROGRESS NOTES
[address]   [facility] [place]   Clinic Note     Date:2/11/22   Patient Name:Al Perez  YOB: 1955  Age: 73 y.o. Primary Care Provider: Isaac Shrestha MD    Subjective     73year old  male referred as a new patient. He had severe nausea and lightheadedness at work last fall without CP or dyspnea. He works at [employer] at a [address] 8-12 hrs a day sometimes carrying heavy objects. He denies CP with that but will get SOB easily. He denies LE edema, orthopnea, PND, palpitations, syncope. He has occasional hematuria. In addition to stress test noted below he says he underwent a TTE which we dont have the results of yet. A focused history review includes:  1. Hypertension  2. CASIE, not on CPAP  3. Hypercholesterolemia - was on ezetimibe in past   4. Exercise MPI in 2013: LVEF 40%, small fixed distal inferior defect  5. Lexiscan MPI October 2021. LVEF 45% with small distal inferior reversible defect  6. Back osteoarthritis  7. Never smoker; social EtOH; no drugs  8. Sister with stroke in her 51s  11. Varicose veins  10. BPH with occasional hematuria - saw Dr. Laney So in past      Past History    Past Medical History:         Diagnosis Date    Arthritis     Back pain     Hypertension     Sleep apnea     has cpap       Past Surgical History:  Past Surgical History:   Procedure Laterality Date    APPENDECTOMY      COLONOSCOPY  2010    EYE SURGERY      laser surgery corrective    HERNIA REPAIR  2001    abd hiatal hernia ?     JOINT REPLACEMENT Left 6/8/16    knee    PROSTHET DEVICE APPLICATION N/A 8/05/2051    CYSTOSCOPY RETROGRADE PYELOGRAM, URETHROSCOPY WITH INSERTION OF PERMANENT ADJUSTABLE TRANS PROSTATIC IMPLANT performed by Loren Knutson MD at [address] [place] [address] Left     bicep surgery       Social History:    Social History     Tobacco History     Smoking Status  Never Smoker    Smokeless Tobacco Use  Never Used          Alcohol History     Alcohol Use Status  Yes Drinks/Week  1 Shots of liquor per week Amount  1.0 standard drink of alcohol/wk Comment  week          Drug Use     Drug Use Status  No          Sexual Activity     Sexually Active  Not Asked                    Family History:-      Problem Relation Age of Onset    Diabetes Mother     Heart Disease Mother     Other Father         parkinsons    Cancer Sister          Review of Systems   Negative except as in HPI        Medications     Current Outpatient Medications   Medication Sig Dispense Refill    HYDROcodone-acetaminophen (NORCO) 5-325 MG per tablet Take 1 tablet by mouth every 6 hours as needed for Pain.  fluticasone (FLONASE) 50 MCG/ACT nasal spray 1 spray by Each Nostril route daily as needed for Rhinitis      tiZANidine (ZANAFLEX) 2 MG tablet Take 2 mg by mouth every 6 hours as needed      amLODIPine (NORVASC) 10 MG tablet Take 1 tablet by mouth daily 90 tablet 3    aspirin (ASPIRIN CHILDRENS) 81 MG chewable tablet Take 1 tablet by mouth daily 90 tablet 3    ibuprofen (ADVIL;MOTRIN) 800 MG tablet Take 1 tablet by mouth 2 times daily as needed for Pain 60 tablet 0    atenolol (TENORMIN) 25 MG tablet Take 25 mg by mouth nightly        No current facility-administered medications for this visit. Physical Examination      BP (!) 180/100 (Site: Left Upper Arm, Position: Sitting, Cuff Size: Medium Adult)   Pulse 53   Resp 18   Ht 5' 8\" (1.727 m)   Wt 224 lb 12.8 oz (102 kg)   SpO2 98%   BMI 34.18 kg/m²   Body surface area is 2.21 meters squared.      General: No acute distress, appears as stated age, nonicteric  Head: Atraumatic, no gross abnormalities or bruises  Neck: Supple and nontender, no carotid bruits, no JVP  Lungs: Clear to auscultation bilaterally, no wheezes, rales, or rhonchi  Heart: Regular rate and rhythm, no murmurs, rubs, or gallops  Abdomen: Soft, nontender, nondistended, normal bowel sounds  Extremities: No obvious deformities, no cyanosis, no edema  Neurological: Alert and oriented x3, EOMI, moving all extremities x4  Psychological: Normal mood and affect, cooperative  Skin: Color, texture, and turgor normal for age          Labs/Imaging/Diagnostics   Personally reviewed:    Lab Results   Component Value Date     06/09/2016    K 4.6 06/09/2016    CL 99 06/09/2016    CO2 28 06/09/2016    BUN 21 06/09/2016    CREATININE 1.3 06/09/2016    GLUCOSE 119 06/09/2016    CALCIUM 8.7 06/09/2016        CrCl cannot be calculated (Patient's most recent lab result is older than the maximum 120 days allowed. ). Lab Results   Component Value Date    WBC 6.9 06/27/2019    HGB 14.6 06/27/2019    HCT 44.7 06/27/2019    MCV 88.9 06/27/2019     06/27/2019       Personally interpreted the following studies.:  EKG: NSR, LAFB, suspect LVH     Assessment and Plan:        72 yo WM with poorly ocntrolled HTN, partly due to compliance difficulties and family history of premature CAD. His episode last fall does not appear to have been cardiac in origin and may have been vasovagal or related to high blood pressure but his stress test is quite abnormal with low EF and some perfusion abnormality. Of note he had a low LVEF on his MPI in 2013 as well with no subsequent work-up. He said that the TTE was also abnormal but I do not have the results of that yet. On exam he is euvolemic and has no symptomatic arrhythmia. Diagnosis Orders   1. Abnormal nuclear stress test  CBC WITH AUTO DIFFERENTIAL    COMPREHENSIVE METABOLIC PANEL    BRAIN NATRIURETIC PEPTIDE    LIPID PANEL    PROTIME-INR   2. Primary hypertension  EKG 12 lead    Sleep study with PAP titration    CBC WITH AUTO DIFFERENTIAL    COMPREHENSIVE METABOLIC PANEL    BRAIN NATRIURETIC PEPTIDE    LIPID PANEL    PROTIME-INR   3. Snoring  Sleep study with PAP titration   4.  SUAREZ (dyspnea on exertion)           Proceed to coronary angiography and revascularization as indicated for abnormal stress test and atypical chest complaints (dyspnea on exertion)   Increase amlodipine to 10 mg daily and continue atenolol as is for now. We will likely change this regimen based on the results of the coronary angiogram   Start aspirin 81 mg daily   Sleep study for Pap titration (he has not been using)   Follow-up with Dr. Victor Manuel Khan regarding his intermittent hematuria        Assessment and plan reviewed with the patient/family and their questions and concerns answered in full.  Follow up with me in 2 months tentatively    We appreciate the opportunity to participate in His care!       Jen Lucero MD, [facility] [place]  Interventional Cardiology/Structural Heart Disease  Office: [phone]  Fax: [phone]  Office Coordinators: Camila Zhong

## 2022-02-16 ENCOUNTER — TELEPHONE (OUTPATIENT)
Dept: SLEEP CENTER | Age: 67
End: 2022-02-16

## 2022-02-17 ENCOUNTER — TELEPHONE (OUTPATIENT)
Dept: CARDIAC CATH/INVASIVE PROCEDURES | Age: 67
End: 2022-02-17

## 2022-02-17 DIAGNOSIS — R94.39 ABNORMAL NUCLEAR STRESS TEST: ICD-10-CM

## 2022-02-17 DIAGNOSIS — I10 PRIMARY HYPERTENSION: ICD-10-CM

## 2022-02-17 LAB
ALBUMIN SERPL-MCNC: 4.1 G/DL (ref 3.5–5.2)
ALP BLD-CCNC: 70 U/L (ref 40–129)
ALT SERPL-CCNC: 38 U/L (ref 0–40)
ANION GAP SERPL CALCULATED.3IONS-SCNC: 10 MMOL/L (ref 7–16)
AST SERPL-CCNC: 25 U/L (ref 0–39)
BASOPHILS ABSOLUTE: 0.03 E9/L (ref 0–0.2)
BASOPHILS RELATIVE PERCENT: 0.2 % (ref 0–2)
BILIRUB SERPL-MCNC: 0.4 MG/DL (ref 0–1.2)
BUN BLDV-MCNC: 30 MG/DL (ref 6–23)
CALCIUM SERPL-MCNC: 9.4 MG/DL (ref 8.6–10.2)
CHLORIDE BLD-SCNC: 107 MMOL/L (ref 98–107)
CHOLESTEROL, TOTAL: 198 MG/DL (ref 0–199)
CO2: 24 MMOL/L (ref 22–29)
CREAT SERPL-MCNC: 1.2 MG/DL (ref 0.7–1.2)
EOSINOPHILS ABSOLUTE: 0.01 E9/L (ref 0.05–0.5)
EOSINOPHILS RELATIVE PERCENT: 0.1 % (ref 0–6)
GFR AFRICAN AMERICAN: >60
GFR NON-AFRICAN AMERICAN: >60 ML/MIN/1.73
GLUCOSE BLD-MCNC: 106 MG/DL (ref 74–99)
HCT VFR BLD CALC: 46.2 % (ref 37–54)
HDLC SERPL-MCNC: 43 MG/DL
HEMOGLOBIN: 14.9 G/DL (ref 12.5–16.5)
IMMATURE GRANULOCYTES #: 0.1 E9/L
IMMATURE GRANULOCYTES %: 0.7 % (ref 0–5)
INR BLD: 1
LDL CHOLESTEROL CALCULATED: 140 MG/DL (ref 0–99)
LYMPHOCYTES ABSOLUTE: 2.33 E9/L (ref 1.5–4)
LYMPHOCYTES RELATIVE PERCENT: 16.4 % (ref 20–42)
MCH RBC QN AUTO: 29.2 PG (ref 26–35)
MCHC RBC AUTO-ENTMCNC: 32.3 % (ref 32–34.5)
MCV RBC AUTO: 90.6 FL (ref 80–99.9)
MONOCYTES ABSOLUTE: 0.72 E9/L (ref 0.1–0.95)
MONOCYTES RELATIVE PERCENT: 5.1 % (ref 2–12)
NEUTROPHILS ABSOLUTE: 11.06 E9/L (ref 1.8–7.3)
NEUTROPHILS RELATIVE PERCENT: 77.5 % (ref 43–80)
PDW BLD-RTO: 12.9 FL (ref 11.5–15)
PLATELET # BLD: 221 E9/L (ref 130–450)
PMV BLD AUTO: 10.8 FL (ref 7–12)
POTASSIUM SERPL-SCNC: 4.3 MMOL/L (ref 3.5–5)
PRO-BNP: 120 PG/ML (ref 0–125)
PROTHROMBIN TIME: 10.7 SEC (ref 9.3–12.4)
RBC # BLD: 5.1 E12/L (ref 3.8–5.8)
SODIUM BLD-SCNC: 141 MMOL/L (ref 132–146)
TOTAL PROTEIN: 7.3 G/DL (ref 6.4–8.3)
TRIGL SERPL-MCNC: 77 MG/DL (ref 0–149)
VLDLC SERPL CALC-MCNC: 15 MG/DL
WBC # BLD: 14.3 E9/L (ref 4.5–11.5)

## 2022-02-17 NOTE — TELEPHONE ENCOUNTER
Reminded patient of scheduled procedure on 2/18  Instructions given and COVID questionnaire completed.

## 2022-02-18 ENCOUNTER — HOSPITAL ENCOUNTER (OUTPATIENT)
Dept: CARDIAC CATH/INVASIVE PROCEDURES | Age: 67
Setting detail: OBSERVATION
Discharge: HOME | End: 2022-02-18
Attending: INTERNAL MEDICINE | Admitting: INTERNAL MEDICINE
Payer: MEDICARE

## 2022-02-18 VITALS
HEART RATE: 55 BPM | DIASTOLIC BLOOD PRESSURE: 82 MMHG | RESPIRATION RATE: 16 BRPM | OXYGEN SATURATION: 97 % | TEMPERATURE: 97.7 F | SYSTOLIC BLOOD PRESSURE: 120 MMHG | BODY MASS INDEX: 33.77 KG/M2 | HEIGHT: 69 IN | WEIGHT: 228 LBS

## 2022-02-18 DIAGNOSIS — I10 PRIMARY HYPERTENSION: Primary | ICD-10-CM

## 2022-02-18 DIAGNOSIS — Z98.890 S/P CARDIAC CATH: ICD-10-CM

## 2022-02-18 DIAGNOSIS — G47.33 OSA (OBSTRUCTIVE SLEEP APNEA): Primary | ICD-10-CM

## 2022-02-18 LAB
ABO/RH: NORMAL
ANION GAP SERPL CALCULATED.3IONS-SCNC: 10 MMOL/L (ref 7–16)
ANTIBODY SCREEN: NORMAL
BASOPHILS ABSOLUTE: 0.04 E9/L (ref 0–0.2)
BASOPHILS RELATIVE PERCENT: 0.3 % (ref 0–2)
BUN BLDV-MCNC: 24 MG/DL (ref 6–23)
CALCIUM SERPL-MCNC: 9.1 MG/DL (ref 8.6–10.2)
CHLORIDE BLD-SCNC: 107 MMOL/L (ref 98–107)
CO2: 26 MMOL/L (ref 22–29)
CREAT SERPL-MCNC: 1 MG/DL (ref 0.7–1.2)
EOSINOPHILS ABSOLUTE: 0.02 E9/L (ref 0.05–0.5)
EOSINOPHILS RELATIVE PERCENT: 0.2 % (ref 0–6)
GFR AFRICAN AMERICAN: >60
GFR NON-AFRICAN AMERICAN: >60 ML/MIN/1.73
GLUCOSE BLD-MCNC: 102 MG/DL (ref 74–99)
HCT VFR BLD CALC: 46.3 % (ref 37–54)
HEMOGLOBIN: 14.9 G/DL (ref 12.5–16.5)
IMMATURE GRANULOCYTES #: 0.1 E9/L
IMMATURE GRANULOCYTES %: 0.8 % (ref 0–5)
LV EF: 45 %
LVEF MODALITY: NORMAL
LYMPHOCYTES ABSOLUTE: 3.36 E9/L (ref 1.5–4)
LYMPHOCYTES RELATIVE PERCENT: 28.1 % (ref 20–42)
MCH RBC QN AUTO: 29.2 PG (ref 26–35)
MCHC RBC AUTO-ENTMCNC: 32.2 % (ref 32–34.5)
MCV RBC AUTO: 90.6 FL (ref 80–99.9)
MONOCYTES ABSOLUTE: 0.63 E9/L (ref 0.1–0.95)
MONOCYTES RELATIVE PERCENT: 5.3 % (ref 2–12)
NEUTROPHILS ABSOLUTE: 7.82 E9/L (ref 1.8–7.3)
NEUTROPHILS RELATIVE PERCENT: 65.3 % (ref 43–80)
PDW BLD-RTO: 12.9 FL (ref 11.5–15)
PLATELET # BLD: 218 E9/L (ref 130–450)
PMV BLD AUTO: 10.1 FL (ref 7–12)
POTASSIUM SERPL-SCNC: 4 MMOL/L (ref 3.5–5)
RBC # BLD: 5.11 E12/L (ref 3.8–5.8)
SODIUM BLD-SCNC: 143 MMOL/L (ref 132–146)
WBC # BLD: 12 E9/L (ref 4.5–11.5)

## 2022-02-18 PROCEDURE — 80048 BASIC METABOLIC PNL TOTAL CA: CPT

## 2022-02-18 PROCEDURE — C1887 CATHETER, GUIDING: HCPCS

## 2022-02-18 PROCEDURE — 6360000002 HC RX W HCPCS

## 2022-02-18 PROCEDURE — 86901 BLOOD TYPING SEROLOGIC RH(D): CPT

## 2022-02-18 PROCEDURE — G0379 DIRECT REFER HOSPITAL OBSERV: HCPCS

## 2022-02-18 PROCEDURE — 85025 COMPLETE CBC W/AUTO DIFF WBC: CPT

## 2022-02-18 PROCEDURE — 36415 COLL VENOUS BLD VENIPUNCTURE: CPT

## 2022-02-18 PROCEDURE — 93458 L HRT ARTERY/VENTRICLE ANGIO: CPT

## 2022-02-18 PROCEDURE — 86900 BLOOD TYPING SEROLOGIC ABO: CPT

## 2022-02-18 PROCEDURE — 86850 RBC ANTIBODY SCREEN: CPT

## 2022-02-18 PROCEDURE — C1894 INTRO/SHEATH, NON-LASER: HCPCS

## 2022-02-18 PROCEDURE — 93458 L HRT ARTERY/VENTRICLE ANGIO: CPT | Performed by: INTERNAL MEDICINE

## 2022-02-18 PROCEDURE — C1769 GUIDE WIRE: HCPCS

## 2022-02-18 PROCEDURE — 2709999900 HC NON-CHARGEABLE SUPPLY

## 2022-02-18 PROCEDURE — 2500000003 HC RX 250 WO HCPCS

## 2022-02-18 PROCEDURE — G0378 HOSPITAL OBSERVATION PER HR: HCPCS

## 2022-02-18 RX ORDER — ROSUVASTATIN CALCIUM 40 MG/1
40 TABLET, COATED ORAL DAILY
Qty: 90 TABLET | Refills: 1 | Status: SHIPPED | OUTPATIENT
Start: 2022-02-18 | End: 2022-10-11 | Stop reason: SDUPTHER

## 2022-02-18 RX ORDER — LISINOPRIL 10 MG/1
10 TABLET ORAL DAILY
Qty: 90 TABLET | Refills: 1 | Status: SHIPPED | OUTPATIENT
Start: 2022-02-18 | End: 2022-06-21

## 2022-02-18 RX ORDER — EZETIMIBE 10 MG/1
10 TABLET ORAL DAILY
COMMUNITY
Start: 2022-02-14 | End: 2022-02-18 | Stop reason: HOSPADM

## 2022-02-18 NOTE — H&P
Update History & Physical    The patient's History and Physical of February 11, 2022 was reviewed with the patient and I examined the patient. There was no change. The surgical site was confirmed by the patient and me. Plan: The risks, benefits, expected outcome, and alternative to the recommended procedure have been discussed with the patient. Patient understands and wants to proceed with the procedure.      Electronically signed by Cristian Bay MD on 2/18/2022 at 11:20 AM

## 2022-03-28 ENCOUNTER — HOSPITAL ENCOUNTER (OUTPATIENT)
Dept: CT IMAGING | Age: 67
Discharge: HOME | End: 2022-03-30
Payer: MEDICARE

## 2022-03-28 DIAGNOSIS — M17.11 OSTEOARTHRITIS OF RIGHT KNEE, UNSPECIFIED OSTEOARTHRITIS TYPE: ICD-10-CM

## 2022-03-28 PROCEDURE — 73700 CT LOWER EXTREMITY W/O DYE: CPT

## 2022-04-18 DIAGNOSIS — R06.09 DOE (DYSPNEA ON EXERTION): Primary | ICD-10-CM

## 2022-04-25 ENCOUNTER — TELEPHONE (OUTPATIENT)
Dept: CARDIOLOGY CLINIC | Age: 67
End: 2022-04-25

## 2022-04-25 NOTE — TELEPHONE ENCOUNTER
Nirmala from Dr. Virgie Devries office called stating the pt is scheduled for T total knee on 5/19/22. She was calling to see if he is cleared for surgery. Pt is scheduled here for 1-2 mo ov on 5/23/22. Please advise.

## 2022-04-25 NOTE — TELEPHONE ENCOUNTER
Records faxed to [facility] at Dr. Jaida Díaz office (last office visit and EKG) at [phone].

## 2022-04-28 ENCOUNTER — HOSPITAL ENCOUNTER (OUTPATIENT)
Dept: CT IMAGING | Age: 67
Discharge: HOME | End: 2022-04-30
Payer: MEDICARE

## 2022-04-28 DIAGNOSIS — M17.11 LOCALIZED OSTEOARTHRITIS OF RIGHT KNEE: ICD-10-CM

## 2022-04-28 PROCEDURE — 73700 CT LOWER EXTREMITY W/O DYE: CPT

## 2022-04-30 ENCOUNTER — TELEPHONE (OUTPATIENT)
Dept: SLEEP CENTER | Age: 67
End: 2022-04-30

## 2022-05-02 ENCOUNTER — HOSPITAL ENCOUNTER (OUTPATIENT)
Dept: SLEEP CENTER | Age: 67
Discharge: HOME | End: 2022-05-02
Payer: MEDICARE

## 2022-05-02 DIAGNOSIS — G47.33 OSA (OBSTRUCTIVE SLEEP APNEA): ICD-10-CM

## 2022-05-02 PROCEDURE — 95810 POLYSOM 6/> YRS 4/> PARAM: CPT

## 2022-05-17 LAB — STATUS: NORMAL

## 2022-05-17 PROCEDURE — 95810 POLYSOM 6/> YRS 4/> PARAM: CPT | Performed by: STUDENT IN AN ORGANIZED HEALTH CARE EDUCATION/TRAINING PROGRAM

## 2022-05-18 ENCOUNTER — TELEPHONE (OUTPATIENT)
Dept: CARDIOLOGY CLINIC | Age: 67
End: 2022-05-18

## 2022-05-18 NOTE — TELEPHONE ENCOUNTER
Message left with ECHO department Dr. Dolly Robles has recommended PT have  ECHO complete before office visit

## 2022-05-24 DIAGNOSIS — G47.33 OSA (OBSTRUCTIVE SLEEP APNEA): Primary | ICD-10-CM

## 2022-05-27 ENCOUNTER — TELEPHONE (OUTPATIENT)
Dept: SLEEP CENTER | Age: 67
End: 2022-05-27

## 2022-06-20 ENCOUNTER — HOSPITAL ENCOUNTER (OUTPATIENT)
Dept: CARDIOLOGY | Age: 67
Discharge: HOME | End: 2022-06-20
Payer: MEDICARE

## 2022-06-20 DIAGNOSIS — R06.09 DOE (DYSPNEA ON EXERTION): ICD-10-CM

## 2022-06-20 LAB
LV EF: 49 %
LVEF MODALITY: NORMAL

## 2022-06-20 PROCEDURE — 93306 TTE W/DOPPLER COMPLETE: CPT

## 2022-06-21 ENCOUNTER — OFFICE VISIT (OUTPATIENT)
Dept: CARDIOLOGY CLINIC | Age: 67
End: 2022-06-21
Payer: MEDICARE

## 2022-06-21 ENCOUNTER — TELEPHONE (OUTPATIENT)
Dept: CARDIOLOGY CLINIC | Age: 67
End: 2022-06-21

## 2022-06-21 VITALS
DIASTOLIC BLOOD PRESSURE: 86 MMHG | HEART RATE: 50 BPM | BODY MASS INDEX: 32.79 KG/M2 | OXYGEN SATURATION: 98 % | RESPIRATION RATE: 16 BRPM | SYSTOLIC BLOOD PRESSURE: 106 MMHG | HEIGHT: 69 IN | WEIGHT: 221.4 LBS

## 2022-06-21 DIAGNOSIS — I42.8 NICM (NONISCHEMIC CARDIOMYOPATHY) (HCC): ICD-10-CM

## 2022-06-21 DIAGNOSIS — I50.22 CHRONIC HFREF (HEART FAILURE WITH REDUCED EJECTION FRACTION) (HCC): Primary | ICD-10-CM

## 2022-06-21 DIAGNOSIS — I10 PRIMARY HYPERTENSION: ICD-10-CM

## 2022-06-21 PROCEDURE — 99214 OFFICE O/P EST MOD 30 MIN: CPT | Performed by: INTERNAL MEDICINE

## 2022-06-21 PROCEDURE — 93000 ELECTROCARDIOGRAM COMPLETE: CPT | Performed by: INTERNAL MEDICINE

## 2022-06-21 PROCEDURE — 1123F ACP DISCUSS/DSCN MKR DOCD: CPT | Performed by: INTERNAL MEDICINE

## 2022-06-21 RX ORDER — LISINOPRIL 10 MG/1
10 TABLET ORAL 2 TIMES DAILY
Qty: 180 TABLET | Refills: 3 | Status: SHIPPED
Start: 2022-06-21 | End: 2022-09-26 | Stop reason: DRUGHIGH

## 2022-06-21 NOTE — PROGRESS NOTES
[address] [facility]   [facility] [place]   Clinic Note     Date:6/25/22   Patient Name:Al Mcguire  YOB: 1955  Age: 73 y.o. Primary Care Provider: Rogelio Tejeda MD    Subjective     73year old  male referred as a new patient. He had severe nausea and lightheadedness at work last fall without CP or dyspnea. He works at [employer] at a [address] 8-12 hrs a day sometimes carrying heavy objects. He denies CP with that but will get SOB easily. He denies LE edema, orthopnea, PND, palpitations, syncope. He has occasional hematuria. Of note he admits to being quite depressed lately but denies SI/HI. He has tried multiple antidepressants in the past without much success. A focused history review includes:  1. Hypertension  2. CASIE, not on CPAP  3. Hypercholesterolemia - was on ezetimibe in past   4. Exercise MPI in 2013: LVEF 40%, small fixed distal inferior defect  5. Lexiscan MPI October 2021. LVEF 45% with small distal inferior reversible defect  6. Coronary angiogram February 2022: Mild epicardial CAD, LVEDP 12.  7. TTE June 2022: ESD 4.2, LVEF 49%, normal RV size and function, dilated ascending aorta 4.2  8. Back osteoarthritis  9. Never smoker; social EtOH; no drugs  10. Sister with stroke in her 52s  10. Varicose veins  12. BPH with occasional hematuria - saw Dr. Orville Gould in past      Past History    Past Medical History:         Diagnosis Date    Arthritis     Back pain     History of blood transfusion     Hyperlipidemia     Hypertension     Sleep apnea     has cpap       Past Surgical History:  Past Surgical History:   Procedure Laterality Date    APPENDECTOMY      COLONOSCOPY  2010    EYE SURGERY      laser surgery corrective    HERNIA REPAIR  2001    abd hiatal hernia ?     JOINT REPLACEMENT Left 6/8/16    knee    PROSTHET DEVICE APPLICATION N/A 1/82/8763    CYSTOSCOPY RETROGRADE PYELOGRAM, URETHROSCOPY WITH INSERTION OF PERMANENT ADJUSTABLE TRANS PROSTATIC IMPLANT performed by Gretchen Sheriff MD at [address] Left     bicep surgery       Social History:    Social History     Tobacco History     Smoking Status  Never Smoker    Smokeless Tobacco Use  Never Used          Alcohol History     Alcohol Use Status  Yes Drinks/Week  1 Shots of liquor per week Amount  1.0 standard drink of alcohol/wk Comment  week          Drug Use     Drug Use Status  No          Sexual Activity     Sexually Active  Not Asked                    Family History:-      Problem Relation Age of Onset    Diabetes Mother     Heart Disease Mother     Other Father         parkinsons    Cancer Sister          Review of Systems   Negative except as in HPI        Medications     Current Outpatient Medications   Medication Sig Dispense Refill    lisinopril (PRINIVIL;ZESTRIL) 10 MG tablet Take 1 tablet by mouth in the morning and at bedtime 180 tablet 3    rosuvastatin (CRESTOR) 40 MG tablet Take 1 tablet by mouth daily 90 tablet 1    HYDROcodone-acetaminophen (NORCO) 5-325 MG per tablet Take 1 tablet by mouth every 6 hours as needed for Pain.  fluticasone (FLONASE) 50 MCG/ACT nasal spray 1 spray by Each Nostril route daily as needed for Rhinitis      tiZANidine (ZANAFLEX) 2 MG tablet Take 2 mg by mouth every 6 hours as needed      ibuprofen (ADVIL;MOTRIN) 800 MG tablet Take 1 tablet by mouth 2 times daily as needed for Pain 60 tablet 0    aspirin (ASPIRIN CHILDRENS) 81 MG chewable tablet Take 1 tablet by mouth daily (Patient not taking: Reported on 6/21/2022) 90 tablet 3     No current facility-administered medications for this visit. Physical Examination      /86 (Site: Left Upper Arm, Position: Sitting, Cuff Size: Medium Adult)   Pulse 50   Resp 16   Ht 5' 9\" (1.753 m)   Wt 221 lb 6.4 oz (100.4 kg)   SpO2 98%   BMI 32.70 kg/m²   Body surface area is 2.21 meters squared.      General: No acute distress, appears as stated age, nonicteric  Head: Atraumatic, no gross abnormalities or bruises  Neck: Supple and nontender, no carotid bruits, elevated JVP  Lungs: Clear to auscultation bilaterally, no wheezes, rales, or rhonchi  Heart: Regular rate and rhythm, no murmurs, rubs, or gallops  Abdomen: Soft, nontender, nondistended, normal bowel sounds  Extremities: No obvious deformities, no cyanosis, trace edema  Neurological: Alert and oriented x3, EOMI, moving all extremities x4  Psychological: Normal mood and affect, cooperative  Skin: Color, texture, and turgor normal for age          Labs/Imaging/Diagnostics   Personally reviewed:    Lab Results   Component Value Date     02/18/2022    K 4.0 02/18/2022     02/18/2022    CO2 26 02/18/2022    BUN 24 02/18/2022    CREATININE 1.0 02/18/2022    GLUCOSE 102 02/18/2022    CALCIUM 9.1 02/18/2022        Estimated Creatinine Clearance: 86 mL/min (based on SCr of 1 mg/dL). Lab Results   Component Value Date    WBC 12.0 (H) 02/18/2022    HGB 14.9 02/18/2022    HCT 46.3 02/18/2022    MCV 90.6 02/18/2022     02/18/2022       Personally interpreted the following studies.:  EKG: NSR, LAFB, suspect LVH     Assessment and Plan:        72 yo WM with the history above. He is NYHA class 1-2 and ever so slightly hypervolemic and bradycardic. He has no angina or symptoms of arrhythmia with a moderate functional load. He is quite depressed. Diagnosis Orders   1. Chronic HFrEF (heart failure with reduced ejection fraction) ([address]     2. Primary hypertension  EKG 12 lead   3. NICM (nonischemic cardiomyopathy) ([address]           PAP titration for CASIE   Stop amlodipine and increase lisinopril to 10 mg PO BID. No HR room for BB   No standing dose diuretic currently.  From a cardiac standpoint he may proceed to non-cardiac surgery without further testing with judicious fluid management perioperatively given his reduced LVEF.  His risk for perioperative MI, arrest and cardiac death is ~1%   Expedited eval by PCP for depression. We placed a call to their office.  Assessment and plan reviewed with the patient/family and their questions and concerns answered in full.  Follow up with me in 2-3 months tentatively for further titration of HF meds after psych stabilization    We appreciate the opportunity to participate in His care!       Adriane Ricks MD, [facility]  Interventional Cardiology/Structural Heart Disease  Office: [phone]  Fax: [phone]  Office Coordinators: Baldev Euceda

## 2022-06-21 NOTE — TELEPHONE ENCOUNTER
Patient having right knee TKA   6-23 under general anesthesia  Dr. Erika Chawla group          ECHO and OV 6-21         Can he be cleared for the 6-23 procedure from a cardiac standpoint .

## 2022-06-23 NOTE — TELEPHONE ENCOUNTER
Clearance form faxed t to [id number] [facility]   [phone] as well as [phone]    Spoke to Dr. Laith Sloan office [facility]) per  Dr. Bertha Lantigua to re evaluate depression concerns.  States she will address with Dr. Bob Villafuerte and have the patient come in for office visit

## 2022-09-12 ENCOUNTER — HOSPITAL ENCOUNTER (OUTPATIENT)
Age: 67
Discharge: HOME | End: 2022-09-12
Payer: MEDICARE

## 2022-09-12 ENCOUNTER — OFFICE VISIT (OUTPATIENT)
Dept: CARDIOLOGY CLINIC | Age: 67
End: 2022-09-12
Payer: MEDICARE

## 2022-09-12 VITALS
HEIGHT: 69 IN | OXYGEN SATURATION: 97 % | WEIGHT: 222.8 LBS | BODY MASS INDEX: 33 KG/M2 | RESPIRATION RATE: 16 BRPM | HEART RATE: 88 BPM | SYSTOLIC BLOOD PRESSURE: 124 MMHG | DIASTOLIC BLOOD PRESSURE: 70 MMHG

## 2022-09-12 DIAGNOSIS — I50.20 HFREF (HEART FAILURE WITH REDUCED EJECTION FRACTION) (HCC): ICD-10-CM

## 2022-09-12 DIAGNOSIS — I50.20 HFREF (HEART FAILURE WITH REDUCED EJECTION FRACTION) (HCC): Primary | ICD-10-CM

## 2022-09-12 DIAGNOSIS — I10 PRIMARY HYPERTENSION: ICD-10-CM

## 2022-09-12 DIAGNOSIS — I42.8 NICM (NONISCHEMIC CARDIOMYOPATHY) (HCC): ICD-10-CM

## 2022-09-12 LAB
ANION GAP SERPL CALCULATED.3IONS-SCNC: 13 MMOL/L (ref 7–16)
BUN BLDV-MCNC: 22 MG/DL (ref 6–23)
CALCIUM SERPL-MCNC: 9.7 MG/DL (ref 8.6–10.2)
CHLORIDE BLD-SCNC: 105 MMOL/L (ref 98–107)
CO2: 24 MMOL/L (ref 22–29)
CREAT SERPL-MCNC: 1.2 MG/DL (ref 0.7–1.2)
GFR AFRICAN AMERICAN: >60
GFR NON-AFRICAN AMERICAN: >60 ML/MIN/1.73
GLUCOSE BLD-MCNC: 98 MG/DL (ref 74–99)
POTASSIUM SERPL-SCNC: 4.1 MMOL/L (ref 3.5–5)
PRO-BNP: 145 PG/ML (ref 0–125)
SODIUM BLD-SCNC: 142 MMOL/L (ref 132–146)

## 2022-09-12 PROCEDURE — 99213 OFFICE O/P EST LOW 20 MIN: CPT | Performed by: INTERNAL MEDICINE

## 2022-09-12 PROCEDURE — 36415 COLL VENOUS BLD VENIPUNCTURE: CPT

## 2022-09-12 PROCEDURE — 93000 ELECTROCARDIOGRAM COMPLETE: CPT | Performed by: INTERNAL MEDICINE

## 2022-09-12 PROCEDURE — 80048 BASIC METABOLIC PNL TOTAL CA: CPT

## 2022-09-12 PROCEDURE — 1123F ACP DISCUSS/DSCN MKR DOCD: CPT | Performed by: INTERNAL MEDICINE

## 2022-09-12 PROCEDURE — 83880 ASSAY OF NATRIURETIC PEPTIDE: CPT

## 2022-09-12 RX ORDER — DULOXETIN HYDROCHLORIDE 60 MG/1
CAPSULE, DELAYED RELEASE ORAL
COMMUNITY
Start: 2022-09-07

## 2022-09-12 RX ORDER — ASPIRIN 81 MG/1
81 TABLET ORAL DAILY
COMMUNITY

## 2022-09-12 RX ORDER — QUETIAPINE FUMARATE 25 MG/1
TABLET, FILM COATED ORAL
COMMUNITY
Start: 2022-09-07

## 2022-09-12 RX ORDER — TIZANIDINE 4 MG/1
TABLET ORAL
COMMUNITY
Start: 2022-06-13

## 2022-09-12 NOTE — PATIENT INSTRUCTIONS
Blood work for congestive heart failure and kidney function  Continue lisinopril as is for now  Please bring your BP machine to calibrate against ours  Return in 6 months

## 2022-09-12 NOTE — PROGRESS NOTES
[address]   Heart [facility] [place]   Clinic Note     Date:9/12/22   Patient Name:Al Paulino  YOB: 1955  Age: 73 y.o. Primary Care Provider: Bushra Carlos MD    Subjective     73year old  male returns for follow up    Breathing okay, no chest pain. BP at home in 150-160/100s for some reason although normal here today; he reports good measurement technique. No edema, no syncope/presyncope/palpitations. No issues after knee surgery. Does more than 10k steps a day at work without CP/SOB. Compliant with meds. His only symptoms is fatigue. He was started on antidepressants with some improvement. A focused history review includes:  Hypertension  CASIE, not on CPAP  Hypercholesterolemia - was on ezetimibe in past   Exercise MPI in 2013: LVEF 40%, small fixed distal inferior defect  Lexiscan MPI October 2021. LVEF 45% with small distal inferior reversible defect  Coronary angiogram February 2022: Mild epicardial CAD, LVEDP 12. TTE June 2022: ESD 4.2, LVEF 49%, normal RV size and function, dilated ascending aorta 4.2  Back osteoarthritis  Never smoker; social EtOH; no drugs  Sister with stroke in her 46s  Varicose veins  BPH with occasional hematuria - saw Dr. Nano Marquis in past      Past History    Past Medical History:         Diagnosis Date    Arthritis     Back pain     HFrEF (heart failure with reduced ejection fraction) ([address]     History of blood transfusion     Hyperlipidemia     Hypertension     Nonischemic cardiomyopathy ([address]     Sleep apnea     has cpap       Past Surgical History:  Past Surgical History:   Procedure Laterality Date    APPENDECTOMY      COLONOSCOPY  2010    EYE SURGERY      laser surgery corrective    HERNIA REPAIR  2001    abd hiatal hernia ?     JOINT REPLACEMENT Left 6/8/16    knee    PROSTHET DEVICE APPLICATION N/A 1/77/7262    CYSTOSCOPY RETROGRADE PYELOGRAM, URETHROSCOPY WITH INSERTION OF PERMANENT ADJUSTABLE TRANS PROSTATIC IMPLANT performed by Suzie Guillermo MD at [address] Left     bicep surgery       Social History:    Social History       Tobacco History       Smoking Status  Never Smoker      Smokeless Tobacco Use  Never Used              Alcohol History       Alcohol Use Status  Yes Drinks/Week  1 Shots of liquor per week Amount  1.0 standard drink of alcohol/wk Comment  week              Drug Use       Drug Use Status  No              Sexual Activity       Sexually Active  Not Asked                        Family History:-      Problem Relation Age of Onset    Diabetes Mother     Heart Disease Mother     Other Father         parkinsons    Cancer Sister          Review of Systems   Negative except as in HPI        Medications     Current Outpatient Medications   Medication Sig Dispense Refill    aspirin 81 MG EC tablet Take 81 mg by mouth daily      tiZANidine (ZANAFLEX) 4 MG tablet TAKE ONE TABLET BY MOUTH TWO TIMES A DAY AS NEEDED FOR 30 DAYS      DULoxetine (CYMBALTA) 60 MG extended release capsule TAKE ONE CAPSULE BY MOUTH EVERY MORNING      QUEtiapine (SEROQUEL) 25 MG tablet TAKE ONE TO TWO TABLETS BY MOUTH EVERY NIGHT AT BEDTIME      lisinopril (PRINIVIL;ZESTRIL) 10 MG tablet Take 1 tablet by mouth in the morning and at bedtime 180 tablet 3    rosuvastatin (CRESTOR) 40 MG tablet Take 1 tablet by mouth daily 90 tablet 1    HYDROcodone-acetaminophen (NORCO) 5-325 MG per tablet Take 1 tablet by mouth every 6 hours as needed for Pain. fluticasone (FLONASE) 50 MCG/ACT nasal spray 1 spray by Each Nostril route daily as needed for Rhinitis      ibuprofen (ADVIL;MOTRIN) 800 MG tablet Take 1 tablet by mouth 2 times daily as needed for Pain 60 tablet 0     No current facility-administered medications for this visit.            Physical Examination      /70 (Site: Right Upper Arm, Position: Sitting, Cuff Size: Medium Adult)   Pulse 88   Resp 16   Ht 5' 9\" (1.753 m)   Wt 222 lb 12.8 oz

## 2022-09-13 ENCOUNTER — TELEPHONE (OUTPATIENT)
Dept: CARDIOLOGY CLINIC | Age: 67
End: 2022-09-13

## 2022-09-13 NOTE — TELEPHONE ENCOUNTER
----- Message from Juanjose Trejo MD sent at 9/13/2022  8:03 AM EDT -----  proBNP is borderline. As such I am reluctant to advise adding any more medications to his cardiac regimen (as noted yesterday his LVEF is only marginally reduced, he is euvolemic on exam on no diuretics).   Recommend following up on elevation of his   blood pressure cuff

## 2022-09-19 ENCOUNTER — TELEPHONE (OUTPATIENT)
Dept: CARDIOLOGY CLINIC | Age: 67
End: 2022-09-19

## 2022-09-19 RX ORDER — CARVEDILOL 3.12 MG/1
3.12 TABLET ORAL 2 TIMES DAILY
Qty: 180 TABLET | Refills: 1 | Status: SHIPPED
Start: 2022-09-19 | End: 2022-09-26

## 2022-09-22 DIAGNOSIS — G89.4 CHRONIC PAIN SYNDROME: ICD-10-CM

## 2022-09-22 DIAGNOSIS — E78.2 MIXED HYPERLIPIDEMIA: ICD-10-CM

## 2022-09-22 DIAGNOSIS — I10 ESSENTIAL (PRIMARY) HYPERTENSION: Primary | ICD-10-CM

## 2022-09-22 DIAGNOSIS — I10 ESSENTIAL (PRIMARY) HYPERTENSION: ICD-10-CM

## 2022-09-22 DIAGNOSIS — R73.01 IMPAIRED FASTING GLUCOSE: ICD-10-CM

## 2022-09-22 LAB
ALBUMIN SERPL-MCNC: 4.2 G/DL (ref 3.5–5.2)
ALP BLD-CCNC: 67 U/L (ref 40–129)
ALT SERPL-CCNC: 47 U/L (ref 0–40)
ANION GAP SERPL CALCULATED.3IONS-SCNC: 12 MMOL/L (ref 7–16)
AST SERPL-CCNC: 29 U/L (ref 0–39)
BASOPHILS ABSOLUTE: 0.03 E9/L (ref 0–0.2)
BASOPHILS RELATIVE PERCENT: 0.3 % (ref 0–2)
BILIRUB SERPL-MCNC: 0.3 MG/DL (ref 0–1.2)
BUN BLDV-MCNC: 26 MG/DL (ref 6–23)
CALCIUM SERPL-MCNC: 9.1 MG/DL (ref 8.6–10.2)
CHLORIDE BLD-SCNC: 106 MMOL/L (ref 98–107)
CHOLESTEROL, TOTAL: 100 MG/DL (ref 0–199)
CO2: 26 MMOL/L (ref 22–29)
CREAT SERPL-MCNC: 1.2 MG/DL (ref 0.7–1.2)
EOSINOPHILS ABSOLUTE: 0.03 E9/L (ref 0.05–0.5)
EOSINOPHILS RELATIVE PERCENT: 0.3 % (ref 0–6)
GFR AFRICAN AMERICAN: >60
GFR NON-AFRICAN AMERICAN: >60 ML/MIN/1.73
GLUCOSE BLD-MCNC: 137 MG/DL (ref 74–99)
HBA1C MFR BLD: 6 % (ref 4–5.6)
HCT VFR BLD CALC: 43.8 % (ref 37–54)
HDLC SERPL-MCNC: 41 MG/DL
HEMOGLOBIN: 13.9 G/DL (ref 12.5–16.5)
IMMATURE GRANULOCYTES #: 0.03 E9/L
IMMATURE GRANULOCYTES %: 0.3 % (ref 0–5)
LDL CHOLESTEROL CALCULATED: 45 MG/DL (ref 0–99)
LYMPHOCYTES ABSOLUTE: 3.51 E9/L (ref 1.5–4)
LYMPHOCYTES RELATIVE PERCENT: 33.1 % (ref 20–42)
MCH RBC QN AUTO: 28.5 PG (ref 26–35)
MCHC RBC AUTO-ENTMCNC: 31.7 % (ref 32–34.5)
MCV RBC AUTO: 89.8 FL (ref 80–99.9)
MONOCYTES ABSOLUTE: 0.49 E9/L (ref 0.1–0.95)
MONOCYTES RELATIVE PERCENT: 4.6 % (ref 2–12)
NEUTROPHILS ABSOLUTE: 6.51 E9/L (ref 1.8–7.3)
NEUTROPHILS RELATIVE PERCENT: 61.4 % (ref 43–80)
PDW BLD-RTO: 13 FL (ref 11.5–15)
PLATELET # BLD: 212 E9/L (ref 130–450)
PMV BLD AUTO: 10.3 FL (ref 7–12)
POTASSIUM SERPL-SCNC: 4 MMOL/L (ref 3.5–5)
RBC # BLD: 4.88 E12/L (ref 3.8–5.8)
SODIUM BLD-SCNC: 144 MMOL/L (ref 132–146)
TOTAL PROTEIN: 7.1 G/DL (ref 6.4–8.3)
TRIGL SERPL-MCNC: 72 MG/DL (ref 0–149)
VLDLC SERPL CALC-MCNC: 14 MG/DL
WBC # BLD: 10.6 E9/L (ref 4.5–11.5)

## 2022-09-23 RX ORDER — OXYCODONE HYDROCHLORIDE AND ACETAMINOPHEN 5; 325 MG/1; MG/1
TABLET ORAL
COMMUNITY
Start: 2022-06-25 | End: 2022-09-26

## 2022-09-23 RX ORDER — AMITRIPTYLINE HYDROCHLORIDE 10 MG/1
TABLET, FILM COATED ORAL
COMMUNITY
Start: 2022-08-16 | End: 2022-09-26

## 2022-09-23 RX ORDER — BUPROPION HYDROCHLORIDE 150 MG/1
TABLET ORAL
COMMUNITY
Start: 2022-08-16 | End: 2022-09-26

## 2022-09-23 RX ORDER — SULFAMETHOXAZOLE AND TRIMETHOPRIM 800; 160 MG/1; MG/1
TABLET ORAL
COMMUNITY
Start: 2022-06-25 | End: 2022-09-26

## 2022-09-23 RX ORDER — AMOXICILLIN 500 MG/1
TABLET, FILM COATED ORAL
COMMUNITY
Start: 2022-08-16 | End: 2022-09-26

## 2022-09-26 ENCOUNTER — OFFICE VISIT (OUTPATIENT)
Dept: PRIMARY CARE CLINIC | Age: 67
End: 2022-09-26
Payer: MEDICARE

## 2022-09-26 VITALS
SYSTOLIC BLOOD PRESSURE: 100 MMHG | DIASTOLIC BLOOD PRESSURE: 70 MMHG | HEIGHT: 69 IN | WEIGHT: 225.3 LBS | OXYGEN SATURATION: 93 % | HEART RATE: 80 BPM | TEMPERATURE: 97.7 F | BODY MASS INDEX: 33.37 KG/M2

## 2022-09-26 DIAGNOSIS — I42.8 NICM (NONISCHEMIC CARDIOMYOPATHY) (HCC): ICD-10-CM

## 2022-09-26 DIAGNOSIS — R73.01 IMPAIRED FASTING GLUCOSE: ICD-10-CM

## 2022-09-26 DIAGNOSIS — I50.20 HFREF (HEART FAILURE WITH REDUCED EJECTION FRACTION) (HCC): Primary | ICD-10-CM

## 2022-09-26 DIAGNOSIS — G47.33 OSA (OBSTRUCTIVE SLEEP APNEA): ICD-10-CM

## 2022-09-26 DIAGNOSIS — I10 ESSENTIAL (PRIMARY) HYPERTENSION: ICD-10-CM

## 2022-09-26 PROCEDURE — 90694 VACC AIIV4 NO PRSRV 0.5ML IM: CPT | Performed by: INTERNAL MEDICINE

## 2022-09-26 PROCEDURE — 99204 OFFICE O/P NEW MOD 45 MIN: CPT | Performed by: INTERNAL MEDICINE

## 2022-09-26 PROCEDURE — 1123F ACP DISCUSS/DSCN MKR DOCD: CPT | Performed by: INTERNAL MEDICINE

## 2022-09-26 PROCEDURE — G0008 ADMIN INFLUENZA VIRUS VAC: HCPCS | Performed by: INTERNAL MEDICINE

## 2022-09-26 RX ORDER — LISINOPRIL 10 MG/1
5 TABLET ORAL 2 TIMES DAILY
Qty: 30 TABLET | Refills: 0 | Status: SHIPPED | OUTPATIENT
Start: 2022-09-26

## 2022-09-26 RX ORDER — METOPROLOL SUCCINATE 25 MG/1
12.5 TABLET, EXTENDED RELEASE ORAL EVERY EVENING
Qty: 30 TABLET | Refills: 3 | Status: SHIPPED | OUTPATIENT
Start: 2022-09-26

## 2022-09-26 SDOH — ECONOMIC STABILITY: FOOD INSECURITY: WITHIN THE PAST 12 MONTHS, YOU WORRIED THAT YOUR FOOD WOULD RUN OUT BEFORE YOU GOT MONEY TO BUY MORE.: NEVER TRUE

## 2022-09-26 SDOH — ECONOMIC STABILITY: FOOD INSECURITY: WITHIN THE PAST 12 MONTHS, THE FOOD YOU BOUGHT JUST DIDN'T LAST AND YOU DIDN'T HAVE MONEY TO GET MORE.: NEVER TRUE

## 2022-09-26 ASSESSMENT — PATIENT HEALTH QUESTIONNAIRE - PHQ9
1. LITTLE INTEREST OR PLEASURE IN DOING THINGS: 0
SUM OF ALL RESPONSES TO PHQ QUESTIONS 1-9: 0
2. FEELING DOWN, DEPRESSED OR HOPELESS: 0
SUM OF ALL RESPONSES TO PHQ9 QUESTIONS 1 & 2: 0

## 2022-09-26 ASSESSMENT — ENCOUNTER SYMPTOMS
SHORTNESS OF BREATH: 1
EYE DISCHARGE: 0
SINUS PAIN: 0
FACIAL SWELLING: 0
EYE ITCHING: 0
RHINORRHEA: 0
GASTROINTESTINAL NEGATIVE: 1
EYES NEGATIVE: 1
EYE PAIN: 0
EYE REDNESS: 0
ALLERGIC/IMMUNOLOGIC NEGATIVE: 1

## 2022-09-26 ASSESSMENT — SOCIAL DETERMINANTS OF HEALTH (SDOH): HOW HARD IS IT FOR YOU TO PAY FOR THE VERY BASICS LIKE FOOD, HOUSING, MEDICAL CARE, AND HEATING?: NOT HARD AT ALL

## 2022-09-26 NOTE — PROGRESS NOTES
Differential:    Lab Results   Component Value Date/Time    WBC 10.6 09/22/2022 09:19 AM    RBC 4.88 09/22/2022 09:19 AM    HGB 13.9 09/22/2022 09:19 AM    HCT 43.8 09/22/2022 09:19 AM     09/22/2022 09:19 AM    MCV 89.8 09/22/2022 09:19 AM    MCH 28.5 09/22/2022 09:19 AM    MCHC 31.7 09/22/2022 09:19 AM    RDW 13.0 09/22/2022 09:19 AM    LYMPHOPCT 33.1 09/22/2022 09:19 AM    MONOPCT 4.6 09/22/2022 09:19 AM    BASOPCT 0.3 09/22/2022 09:19 AM    MONOSABS 0.49 09/22/2022 09:19 AM    LYMPHSABS 3.51 09/22/2022 09:19 AM    EOSABS 0.03 09/22/2022 09:19 AM    BASOSABS 0.03 09/22/2022 09:19 AM     CMP:    Lab Results   Component Value Date/Time     09/22/2022 09:19 AM    K 4.0 09/22/2022 09:19 AM     09/22/2022 09:19 AM    CO2 26 09/22/2022 09:19 AM    BUN 26 09/22/2022 09:19 AM    CREATININE 1.2 09/22/2022 09:19 AM    GFRAA >60 09/22/2022 09:19 AM    LABGLOM >60 09/22/2022 09:19 AM    GLUCOSE 137 09/22/2022 09:19 AM    PROT 7.1 09/22/2022 09:19 AM    LABALBU 4.2 09/22/2022 09:19 AM    CALCIUM 9.1 09/22/2022 09:19 AM    BILITOT 0.3 09/22/2022 09:19 AM    ALKPHOS 67 09/22/2022 09:19 AM    AST 29 09/22/2022 09:19 AM    ALT 47 09/22/2022 09:19 AM     HgBA1c:    Lab Results   Component Value Date/Time    LABA1C 6.0 09/22/2022 09:17 AM     Lipid:   Lab Results   Component Value Date    CHOL 100 09/22/2022    CHOL 198 02/17/2022     Lab Results   Component Value Date    TRIG 72 09/22/2022    TRIG 77 02/17/2022     Lab Results   Component Value Date    HDL 41 09/22/2022    HDL 43 02/17/2022     Lab Results   Component Value Date    LDLCALC 45 09/22/2022    LDLCALC 140 (H) 02/17/2022     Lab Results   Component Value Date    LABVLDL 14 09/22/2022    LABVLDL 15 02/17/2022              Objective   /70   Pulse 80   Temp 97.7 °F (36.5 °C) (Temporal)   Ht 5' 9\" (1.753 m)   Wt 225 lb 4.8 oz (102.2 kg)   SpO2 93%   BMI 33.27 kg/m²   Current Outpatient Medications   Medication Sig Dispense Refill lisinopril (PRINIVIL;ZESTRIL) 10 MG tablet Take 0.5 tablets by mouth in the morning and at bedtime 30 tablet 0    metoprolol succinate (TOPROL XL) 25 MG extended release tablet Take 0.5 tablets by mouth every evening 30 tablet 3    aspirin 81 MG EC tablet Take 81 mg by mouth daily      tiZANidine (ZANAFLEX) 4 MG tablet TAKE ONE TABLET BY MOUTH TWO TIMES A DAY AS NEEDED FOR 30 DAYS      DULoxetine (CYMBALTA) 60 MG extended release capsule TAKE ONE CAPSULE BY MOUTH EVERY MORNING      QUEtiapine (SEROQUEL) 25 MG tablet TAKE ONE TO TWO TABLETS BY MOUTH EVERY NIGHT AT BEDTIME      rosuvastatin (CRESTOR) 40 MG tablet Take 1 tablet by mouth daily 90 tablet 1     No current facility-administered medications for this visit. No Known Allergies     Past Medical History:   Diagnosis Date    Anxiety     Arthritis     Back pain     Depression     GERD (gastroesophageal reflux disease)     HFrEF (heart failure with reduced ejection fraction) ([facility])     History of blood transfusion     Hyperlipidemia     Hypertension     Nonischemic cardiomyopathy ([facility] [address]     Sleep apnea     has cpap     Past Surgical History:   Procedure Laterality Date    APPENDECTOMY      COLONOSCOPY  2010    EYE SURGERY      laser surgery corrective    HERNIA REPAIR  2001    abd hiatal hernia ?     JOINT REPLACEMENT Left 6/8/16    knee    PROSTHET DEVICE APPLICATION N/A 2/19/5466    CYSTOSCOPY RETROGRADE PYELOGRAM, URETHROSCOPY WITH INSERTION OF PERMANENT ADJUSTABLE TRANS PROSTATIC IMPLANT performed by Mela Devries MD at [address] [place] [address] Left     bicep surgery     Family History   Problem Relation Age of Onset    Diabetes Mother     Heart Disease Mother     Other Father         parkinsons    Cancer Sister       Social History     Socioeconomic History    Marital status:      Spouse name: Not on file    Number of children: Not on file    Years of education: Not on file    Highest education level: Not on file   Occupational History Not on file   Tobacco Use    Smoking status: Never    Smokeless tobacco: Never   Vaping Use    Vaping Use: Never used   Substance and Sexual Activity    Alcohol use: Not Currently     Comment: rare    Drug use: Never    Sexual activity: Not on file   Other Topics Concern    Not on file   Social History Narrative    Drinks 6-8 iced tea or pop daily     Social Determinants of Health     Financial Resource Strain: Low Risk     Difficulty of Paying Living Expenses: Not hard at all   Food Insecurity: No Food Insecurity    Worried About Running Out of Food in the Last Year: Never true    Ran Out of Food in the Last Year: Never true   Transportation Needs: Not on file   Physical Activity: Not on file   Stress: Not on file   Social Connections: Not on file   Intimate Partner Violence: Not on file   Housing Stability: Not on file      Health Maintenance Due   Topic Date Due    Depression Screen  Never done    HIV screen  Never done    Hepatitis C screen  Never done    Shingles vaccine (1 of 2) Never done    COVID-19 Vaccine (3 - Booster for Dempsey Peter series) 12/15/2021    Flu vaccine (1) 08/01/2022    lski    Physical Exam  Constitutional:       General: He is not in acute distress. Appearance: Normal appearance. He is normal weight. HENT:      Head: Normocephalic and atraumatic. Right Ear: Tympanic membrane, ear canal and external ear normal.      Left Ear: Tympanic membrane, ear canal and external ear normal. There is no impacted cerumen. Nose: Nose normal. No congestion or rhinorrhea. Mouth/Throat:      Mouth: Mucous membranes are moist.      Pharynx: Oropharynx is clear. No oropharyngeal exudate or posterior oropharyngeal erythema. Eyes:      General: No scleral icterus. Right eye: No discharge. Left eye: No discharge. Pupils: Pupils are equal, round, and reactive to light. Neck:      Vascular: No carotid bruit.    Cardiovascular:      Rate and Rhythm: Normal rate and regular

## 2022-10-11 ENCOUNTER — OFFICE VISIT (OUTPATIENT)
Dept: PRIMARY CARE CLINIC | Age: 67
End: 2022-10-11
Payer: MEDICARE

## 2022-10-11 VITALS
BODY MASS INDEX: 33.34 KG/M2 | HEART RATE: 86 BPM | TEMPERATURE: 96.8 F | OXYGEN SATURATION: 96 % | HEIGHT: 69 IN | SYSTOLIC BLOOD PRESSURE: 102 MMHG | WEIGHT: 225.1 LBS | DIASTOLIC BLOOD PRESSURE: 72 MMHG

## 2022-10-11 DIAGNOSIS — E78.2 MIXED HYPERLIPIDEMIA: ICD-10-CM

## 2022-10-11 DIAGNOSIS — G47.33 OSA (OBSTRUCTIVE SLEEP APNEA): ICD-10-CM

## 2022-10-11 DIAGNOSIS — I10 PRIMARY HYPERTENSION: Primary | ICD-10-CM

## 2022-10-11 PROCEDURE — 99214 OFFICE O/P EST MOD 30 MIN: CPT | Performed by: INTERNAL MEDICINE

## 2022-10-11 PROCEDURE — 1123F ACP DISCUSS/DSCN MKR DOCD: CPT | Performed by: INTERNAL MEDICINE

## 2022-10-11 RX ORDER — ROSUVASTATIN CALCIUM 40 MG/1
40 TABLET, COATED ORAL DAILY
Qty: 90 TABLET | Refills: 1 | Status: SHIPPED | OUTPATIENT
Start: 2022-10-11

## 2022-10-11 NOTE — PROGRESS NOTES
Alfreda Maher (:  1955) is a 73 y.o. male,Established patient, here for evaluation of the following chief complaint(s):  2 Week Follow-Up (States he is still tired and nausea )      Subjective   SUBJECTIVE/OBJECTIVE:  HPI    Review of Systems  CBC with Differential:    Lab Results   Component Value Date/Time    WBC 10.6 2022 09:19 AM    RBC 4.88 2022 09:19 AM    HGB 13.9 2022 09:19 AM    HCT 43.8 2022 09:19 AM     2022 09:19 AM    MCV 89.8 2022 09:19 AM    MCH 28.5 2022 09:19 AM    MCHC 31.7 2022 09:19 AM    RDW 13.0 2022 09:19 AM    LYMPHOPCT 33.1 2022 09:19 AM    MONOPCT 4.6 2022 09:19 AM    BASOPCT 0.3 2022 09:19 AM    MONOSABS 0.49 2022 09:19 AM    LYMPHSABS 3.51 2022 09:19 AM    EOSABS 0.03 2022 09:19 AM    BASOSABS 0.03 2022 09:19 AM     CMP:    Lab Results   Component Value Date/Time     2022 09:19 AM    K 4.0 2022 09:19 AM     2022 09:19 AM    CO2 26 2022 09:19 AM    BUN 26 2022 09:19 AM    CREATININE 1.2 2022 09:19 AM    GFRAA >60 2022 09:19 AM    LABGLOM >60 2022 09:19 AM    GLUCOSE 137 2022 09:19 AM    PROT 7.1 2022 09:19 AM    LABALBU 4.2 2022 09:19 AM    CALCIUM 9.1 2022 09:19 AM    BILITOT 0.3 2022 09:19 AM    ALKPHOS 67 2022 09:19 AM    AST 29 2022 09:19 AM    ALT 47 2022 09:19 AM     HgBA1c:    Lab Results   Component Value Date/Time    LABA1C 6.0 2022 09:17 AM     Lipid:   Lab Results   Component Value Date    CHOL 100 2022    CHOL 198 2022     Lab Results   Component Value Date    TRIG 72 2022    TRIG 77 2022     Lab Results   Component Value Date    HDL 41 2022    HDL 43 2022     Lab Results   Component Value Date    LDLCALC 45 2022    LDLCALC 140 (H) 2022     Lab Results   Component Value Date    LABVLDL 14 2022 LABVLDL 15 02/17/2022     No results found for: CHOLHDLRATIO         Objective   /72   Pulse 86   Temp 96.8 °F (36 °C) (Temporal)   Ht 5' 9\" (1.753 m)   Wt 225 lb 1.6 oz (102.1 kg)   SpO2 96%   BMI 33.24 kg/m²   Current Outpatient Medications   Medication Sig Dispense Refill    rosuvastatin (CRESTOR) 40 MG tablet Take 1 tablet by mouth daily 90 tablet 1    lisinopril (PRINIVIL;ZESTRIL) 10 MG tablet Take 0.5 tablets by mouth in the morning and at bedtime 30 tablet 0    metoprolol succinate (TOPROL XL) 25 MG extended release tablet Take 0.5 tablets by mouth every evening 30 tablet 3    aspirin 81 MG EC tablet Take 81 mg by mouth daily      tiZANidine (ZANAFLEX) 4 MG tablet TAKE ONE TABLET BY MOUTH TWO TIMES A DAY AS NEEDED FOR 30 DAYS      DULoxetine (CYMBALTA) 60 MG extended release capsule TAKE ONE CAPSULE BY MOUTH EVERY MORNING      QUEtiapine (SEROQUEL) 25 MG tablet TAKE ONE TO TWO TABLETS BY MOUTH EVERY NIGHT AT BEDTIME       No current facility-administered medications for this visit. No Known Allergies     Past Medical History:   Diagnosis Date    Anxiety     Arthritis     Back pain     Depression     GERD (gastroesophageal reflux disease)     HFrEF (heart failure with reduced ejection fraction) ([facility])     History of blood transfusion     Hyperlipidemia     Hypertension     Nonischemic cardiomyopathy ([address]     Sleep apnea     has cpap     Past Surgical History:   Procedure Laterality Date    APPENDECTOMY      COLONOSCOPY  2010    EYE SURGERY      laser surgery corrective    HERNIA REPAIR  2001    abd hiatal hernia ?     JOINT REPLACEMENT Left 6/8/16    knee    PROSTHET DEVICE APPLICATION N/A 7/43/1486    CYSTOSCOPY RETROGRADE PYELOGRAM, URETHROSCOPY WITH INSERTION OF PERMANENT ADJUSTABLE TRANS PROSTATIC IMPLANT performed by Bailey Haley MD at [facility] Left     bicep surgery     Family History   Problem Relation Age of Onset    Diabetes Mother     Heart Disease Mother Other Father         parkinsons    Cancer Sister       Social History     Socioeconomic History    Marital status:      Spouse name: Not on file    Number of children: Not on file    Years of education: Not on file    Highest education level: Not on file   Occupational History    Not on file   Tobacco Use    Smoking status: Never    Smokeless tobacco: Never   Vaping Use    Vaping Use: Never used   Substance and Sexual Activity    Alcohol use: Not Currently     Comment: rare    Drug use: Never    Sexual activity: Not on file   Other Topics Concern    Not on file   Social History Narrative    Drinks 6-8 iced tea or pop daily     Social Determinants of Health     Financial Resource Strain: Low Risk     Difficulty of Paying Living Expenses: Not hard at all   Food Insecurity: No Food Insecurity    Worried About Running Out of Food in the Last Year: Never true    Ran Out of Food in the Last Year: Never true   Transportation Needs: Not on file   Physical Activity: Not on file   Stress: Not on file   Social Connections: Not on file   Intimate Partner Violence: Not on file   Housing Stability: Not on file      Health Maintenance Due   Topic Date Due    HIV screen  Never done    Hepatitis C screen  Never done    Shingles vaccine (1 of 2) Never done    COVID-19 Vaccine (3 - Booster for Dempsey Peter series) 12/15/2021    lski    Physical Exam  Constitutional:       General: He is not in acute distress. Appearance: Normal appearance. He is normal weight. HENT:      Head: Normocephalic and atraumatic. Right Ear: Tympanic membrane, ear canal and external ear normal.      Left Ear: Tympanic membrane, ear canal and external ear normal. There is no impacted cerumen. Nose: Nose normal. No congestion or rhinorrhea. Mouth/Throat:      Mouth: Mucous membranes are moist.      Pharynx: Oropharynx is clear. No oropharyngeal exudate or posterior oropharyngeal erythema. Eyes:      General: No scleral icterus. Right eye: No discharge. Left eye: No discharge. Pupils: Pupils are equal, round, and reactive to light. Neck:      Vascular: No carotid bruit. Cardiovascular:      Rate and Rhythm: Normal rate and regular rhythm. Pulses: Normal pulses. Heart sounds: No murmur heard. No gallop. Pulmonary:      Effort: Pulmonary effort is normal. No respiratory distress. Breath sounds: Normal breath sounds. No wheezing, rhonchi or rales. Chest:      Chest wall: No tenderness. Abdominal:      General: Bowel sounds are normal.      Palpations: Abdomen is soft. There is no mass. Tenderness: There is no abdominal tenderness. There is no guarding. Hernia: No hernia is present. Musculoskeletal:         General: No swelling, tenderness, deformity or signs of injury. Cervical back: Normal range of motion and neck supple. No rigidity or tenderness. Right lower leg: No edema. Left lower leg: No edema. Lymphadenopathy:      Cervical: No cervical adenopathy. Skin:     General: Skin is warm and dry. Capillary Refill: Capillary refill takes 2 to 3 seconds. Findings: No bruising, lesion or rash. Neurological:      General: No focal deficit present. Mental Status: He is alert and oriented to person, place, and time. Sensory: No sensory deficit. Motor: No weakness. Gait: Gait normal.   Psychiatric:         Mood and Affect: Mood normal.         Behavior: Behavior normal.         Thought Content: Thought content normal.         Judgment: Judgment normal.             ASSESSMENT/PLAN:  1. Primary hypertension  2. Mixed hyperlipidemia  3. CASIE (obstructive sleep apnea)  Got a new bed adjustable he puts it up about 6 inches since then he has no need to use CPAP and he has been sleeping about 7-7.5 hours feeling rested  No follow-ups on file. An electronic signature was used to authenticate this note.     --Annmarie Murray MD

## 2023-01-23 NOTE — PROGRESS NOTES
Al Butler (:  1955) is a 67 y.o. male,Established patient, here for evaluation of the following chief complaint(s):  3 Month Follow-Up      Subjective   SUBJECTIVE/OBJECTIVE:  HPI    Review of Systems   Cardiovascular:  Positive for leg swelling.   Musculoskeletal:  Positive for back pain.   All other systems reviewed and are negative.  CBC with Differential:    Lab Results   Component Value Date/Time    WBC 8.0 2023 03:28 PM    RBC 5.32 2023 03:28 PM    HGB 15.1 2023 03:28 PM    HCT 44.9 2023 03:28 PM     2023 03:28 PM    MCV 84.4 2023 03:28 PM    MCH 28.4 2023 03:28 PM    MCHC 33.6 2023 03:28 PM    RDW 12.9 2023 03:28 PM    LYMPHOPCT 33.1 2022 09:19 AM    MONOPCT 4.6 2022 09:19 AM    BASOPCT 0.3 2022 09:19 AM    MONOSABS 0.49 2022 09:19 AM    LYMPHSABS 3.51 2022 09:19 AM    EOSABS 0.03 2022 09:19 AM    BASOSABS 0.03 2022 09:19 AM     CMP:    Lab Results   Component Value Date/Time     2023 03:28 PM    K 4.8 2023 03:28 PM     2023 03:28 PM    CO2 22 2023 03:28 PM    BUN 17 2023 03:28 PM    CREATININE 1.1 2023 03:28 PM    GFRAA >60 2022 09:19 AM    LABGLOM >60 2023 03:28 PM    GLUCOSE 95 2023 03:28 PM    PROT 7.6 2023 03:28 PM    LABALBU 4.3 2023 03:28 PM    CALCIUM 9.7 2023 03:28 PM    BILITOT 0.9 2023 03:28 PM    ALKPHOS 73 2023 03:28 PM    AST 31 2023 03:28 PM    ALT 30 2023 03:28 PM     Lipid:   Lab Results   Component Value Date    CHOL 100 2022    CHOL 198 2022     Lab Results   Component Value Date    TRIG 72 2022    TRIG 77 2022     Lab Results   Component Value Date    HDL 39 2023    HDL 41 2022    HDL 43 2022     Lab Results   Component Value Date    LDLCALC 152 (H) 2023    LDLCALC 45 2022    LDLCALC 140 (H) 2022     Lab  Results   Component Value Date    LABVLDL 17 01/25/2023    LABVLDL 14 09/22/2022    LABVLDL 15 02/17/2022     No results found for: CHOLHDLRATIO         Objective   BP (!) 150/80   Pulse 89   Temp 98.9 °F (37.2 °C) (Temporal)   Ht 5' 9\" (1.753 m)   Wt 227 lb (103 kg)   SpO2 97%   BMI 33.52 kg/m²   Current Outpatient Medications   Medication Sig Dispense Refill    HYDROcodone-acetaminophen (NORCO) 5-325 MG per tablet TAKE ONE TABLET BY MOUTH EVERY DAY AS NEEDED FOR 30 DAYS      meloxicam (MOBIC) 7.5 MG tablet TAKE 1 TABLET BY MOUTH ONCE A DAY WITH A MEAL      lisinopril (PRINIVIL;ZESTRIL) 10 MG tablet Take 1 tablet by mouth in the morning and at bedtime 180 tablet 1    rosuvastatin (CRESTOR) 20 MG tablet Take 1 tablet by mouth nightly 90 tablet 1    metoprolol succinate (TOPROL XL) 25 MG extended release tablet Take 0.5 tablets by mouth every evening 30 tablet 3    aspirin 81 MG EC tablet Take 81 mg by mouth daily      tiZANidine (ZANAFLEX) 4 MG tablet TAKE ONE TABLET BY MOUTH TWO TIMES A DAY AS NEEDED FOR 30 DAYS      DULoxetine (CYMBALTA) 60 MG extended release capsule TAKE ONE CAPSULE BY MOUTH EVERY MORNING (Patient not taking: Reported on 1/31/2023)      QUEtiapine (SEROQUEL) 25 MG tablet TAKE ONE TO TWO TABLETS BY MOUTH EVERY NIGHT AT BEDTIME (Patient not taking: Reported on 1/31/2023)       No current facility-administered medications for this visit. No Known Allergies     Past Medical History:   Diagnosis Date    Anxiety     Arthritis     Back pain     Depression     GERD (gastroesophageal reflux disease)     HFrEF (heart failure with reduced ejection fraction) ([facility])     History of blood transfusion     Hyperlipidemia     Hypertension     Nonischemic cardiomyopathy ([facility] [address]     Sleep apnea     has cpap     Past Surgical History:   Procedure Laterality Date    APPENDECTOMY      COLONOSCOPY  2010    EYE SURGERY      laser surgery corrective    HERNIA REPAIR  2001    abd hiatal hernia ?     JOINT REPLACEMENT Left 6/8/16    knee    PROSTHET DEVICE APPLICATION N/A 6/59/8724    CYSTOSCOPY RETROGRADE PYELOGRAM, URETHROSCOPY WITH INSERTION OF PERMANENT ADJUSTABLE TRANS PROSTATIC IMPLANT performed by Yvonne Tinsley MD at [address] [place] [address] Left     bicep surgery     Family History   Problem Relation Age of Onset    Diabetes Mother     Heart Disease Mother     Other Father         parkinsons    Cancer Sister       Social History     Socioeconomic History    Marital status:      Spouse name: Not on file    Number of children: Not on file    Years of education: Not on file    Highest education level: Not on file   Occupational History    Not on file   Tobacco Use    Smoking status: Never    Smokeless tobacco: Never   Vaping Use    Vaping Use: Never used   Substance and Sexual Activity    Alcohol use: Not Currently     Comment: rare    Drug use: Never    Sexual activity: Not on file   Other Topics Concern    Not on file   Social History Narrative    Drinks 6-8 iced tea or pop daily     Social Determinants of Health     Financial Resource Strain: Low Risk     Difficulty of Paying Living Expenses: Not hard at all   Food Insecurity: No Food Insecurity    Worried About Running Out of Food in the Last Year: Never true    Ran Out of Food in the Last Year: Never true   Transportation Needs: Not on file   Physical Activity: Not on file   Stress: Not on file   Social Connections: Not on file   Intimate Partner Violence: Not on file   Housing Stability: Not on file      Health Maintenance Due   Topic Date Due    Hepatitis C screen  Never done    Shingles vaccine (1 of 2) Never done    COVID-19 Vaccine (3 - Booster for Dempsey Peter series) 09/09/2021    DTaP/Tdap/Td vaccine (2 - Td or Tdap) 10/18/2022    Pneumococcal 65+ years Vaccine (2 - PPSV23 if available, else PCV20) 11/08/2022    Annual Wellness Visit (AWV)  02/15/2023    lski    Physical Exam  Constitutional:       General: He is not in acute distress. Appearance: Normal appearance. He is obese. HENT:      Head: Normocephalic and atraumatic. Right Ear: Tympanic membrane, ear canal and external ear normal.      Left Ear: Tympanic membrane, ear canal and external ear normal. There is no impacted cerumen. Nose: Nose normal. No congestion or rhinorrhea. Mouth/Throat:      Mouth: Mucous membranes are moist.      Pharynx: Oropharynx is clear. No oropharyngeal exudate or posterior oropharyngeal erythema. Eyes:      General: No scleral icterus. Right eye: No discharge. Left eye: No discharge. Pupils: Pupils are equal, round, and reactive to light. Neck:      Vascular: No carotid bruit. Cardiovascular:      Rate and Rhythm: Normal rate and regular rhythm. Pulses: Normal pulses. Heart sounds: No murmur heard. No gallop. Pulmonary:      Effort: Pulmonary effort is normal. No respiratory distress. Breath sounds: Normal breath sounds. No wheezing, rhonchi or rales. Chest:      Chest wall: No tenderness. Abdominal:      General: Bowel sounds are normal.      Palpations: Abdomen is soft. There is no mass. Tenderness: There is no abdominal tenderness. There is no guarding. Hernia: No hernia is present. Musculoskeletal:         General: Tenderness present. No swelling, deformity or signs of injury. Cervical back: Normal range of motion and neck supple. No rigidity or tenderness. Right lower leg: Edema present. Left lower leg: Edema present. Comments: LOWER BACK   Lymphadenopathy:      Cervical: No cervical adenopathy. Skin:     General: Skin is warm and dry. Capillary Refill: Capillary refill takes 2 to 3 seconds. Findings: No bruising, lesion or rash. Neurological:      General: No focal deficit present. Mental Status: He is alert and oriented to person, place, and time. Sensory: No sensory deficit. Motor: No weakness.       Gait: Gait normal. Psychiatric:         Mood and Affect: Mood normal.         Behavior: Behavior normal.         Thought Content: Thought content normal.         Judgment: Judgment normal.             ASSESSMENT/PLAN:  1. Mixed hyperlipidemia  -     rosuvastatin (CRESTOR) 20 MG tablet; Take 1 tablet by mouth nightly, Disp-90 tablet, R-1Normal  -     [facility] Physical Medicine and Rehabilitation  -     CBC; Future  -     Comprehensive Metabolic Panel; Future  -     Lipid, Fasting; Future  2. Chronic pain syndrome  3. Essential (primary) hypertension  -     CBC; Future  -     Comprehensive Metabolic Panel; Future  -     Lipid, Fasting; Future    No follow-ups on file. An electronic signature was used to authenticate this note.     --Bailey Nolan MD

## 2023-01-25 DIAGNOSIS — I10 PRIMARY HYPERTENSION: ICD-10-CM

## 2023-01-25 DIAGNOSIS — E78.2 MIXED HYPERLIPIDEMIA: ICD-10-CM

## 2023-01-25 LAB
ALBUMIN SERPL-MCNC: 4.3 G/DL (ref 3.5–5.2)
ALP BLD-CCNC: 73 U/L (ref 40–129)
ALT SERPL-CCNC: 30 U/L (ref 0–40)
ANION GAP SERPL CALCULATED.3IONS-SCNC: 15 MMOL/L (ref 7–16)
AST SERPL-CCNC: 31 U/L (ref 0–39)
BILIRUB SERPL-MCNC: 0.9 MG/DL (ref 0–1.2)
BUN BLDV-MCNC: 17 MG/DL (ref 6–23)
CALCIUM SERPL-MCNC: 9.7 MG/DL (ref 8.6–10.2)
CHLORIDE BLD-SCNC: 104 MMOL/L (ref 98–107)
CHOLESTEROL, FASTING: 208 MG/DL (ref 0–199)
CO2: 22 MMOL/L (ref 22–29)
CREAT SERPL-MCNC: 1.1 MG/DL (ref 0.7–1.2)
GFR SERPL CREATININE-BSD FRML MDRD: >60 ML/MIN/1.73
GLUCOSE BLD-MCNC: 95 MG/DL (ref 74–99)
HCT VFR BLD CALC: 44.9 % (ref 37–54)
HDLC SERPL-MCNC: 39 MG/DL
HEMOGLOBIN: 15.1 G/DL (ref 12.5–16.5)
LDL CHOLESTEROL CALCULATED: 152 MG/DL (ref 0–99)
MCH RBC QN AUTO: 28.4 PG (ref 26–35)
MCHC RBC AUTO-ENTMCNC: 33.6 % (ref 32–34.5)
MCV RBC AUTO: 84.4 FL (ref 80–99.9)
PDW BLD-RTO: 12.9 FL (ref 11.5–15)
PLATELET # BLD: 212 E9/L (ref 130–450)
PMV BLD AUTO: 10.9 FL (ref 7–12)
POTASSIUM SERPL-SCNC: 4.8 MMOL/L (ref 3.5–5)
RBC # BLD: 5.32 E12/L (ref 3.8–5.8)
SODIUM BLD-SCNC: 141 MMOL/L (ref 132–146)
TOTAL PROTEIN: 7.6 G/DL (ref 6.4–8.3)
TRIGLYCERIDE, FASTING: 86 MG/DL (ref 0–149)
VLDLC SERPL CALC-MCNC: 17 MG/DL
WBC # BLD: 8 E9/L (ref 4.5–11.5)

## 2023-01-31 ENCOUNTER — OFFICE VISIT (OUTPATIENT)
Dept: PRIMARY CARE CLINIC | Age: 68
End: 2023-01-31
Payer: MEDICARE

## 2023-01-31 VITALS
OXYGEN SATURATION: 97 % | TEMPERATURE: 98.9 F | BODY MASS INDEX: 33.62 KG/M2 | SYSTOLIC BLOOD PRESSURE: 150 MMHG | HEART RATE: 89 BPM | DIASTOLIC BLOOD PRESSURE: 80 MMHG | HEIGHT: 69 IN | WEIGHT: 227 LBS

## 2023-01-31 DIAGNOSIS — E78.2 MIXED HYPERLIPIDEMIA: Primary | ICD-10-CM

## 2023-01-31 DIAGNOSIS — G89.4 CHRONIC PAIN SYNDROME: ICD-10-CM

## 2023-01-31 DIAGNOSIS — I10 ESSENTIAL (PRIMARY) HYPERTENSION: ICD-10-CM

## 2023-01-31 PROCEDURE — 99214 OFFICE O/P EST MOD 30 MIN: CPT | Performed by: INTERNAL MEDICINE

## 2023-01-31 PROCEDURE — 3079F DIAST BP 80-89 MM HG: CPT | Performed by: INTERNAL MEDICINE

## 2023-01-31 PROCEDURE — 3077F SYST BP >= 140 MM HG: CPT | Performed by: INTERNAL MEDICINE

## 2023-01-31 PROCEDURE — 1123F ACP DISCUSS/DSCN MKR DOCD: CPT | Performed by: INTERNAL MEDICINE

## 2023-01-31 RX ORDER — ROSUVASTATIN CALCIUM 20 MG/1
20 TABLET, COATED ORAL NIGHTLY
Qty: 90 TABLET | Refills: 1 | Status: SHIPPED | OUTPATIENT
Start: 2023-01-31

## 2023-01-31 RX ORDER — LISINOPRIL 10 MG/1
10 TABLET ORAL 2 TIMES DAILY
Qty: 180 TABLET | Refills: 1 | Status: SHIPPED | OUTPATIENT
Start: 2023-01-31

## 2023-01-31 RX ORDER — HYDROCODONE BITARTRATE AND ACETAMINOPHEN 5; 325 MG/1; MG/1
TABLET ORAL
COMMUNITY
Start: 2022-12-21

## 2023-01-31 RX ORDER — MELOXICAM 7.5 MG/1
TABLET ORAL
COMMUNITY
Start: 2023-01-25

## 2023-01-31 ASSESSMENT — PATIENT HEALTH QUESTIONNAIRE - PHQ9
SUM OF ALL RESPONSES TO PHQ QUESTIONS 1-9: 0
SUM OF ALL RESPONSES TO PHQ QUESTIONS 1-9: 0
1. LITTLE INTEREST OR PLEASURE IN DOING THINGS: 0
2. FEELING DOWN, DEPRESSED OR HOPELESS: 0
SUM OF ALL RESPONSES TO PHQ9 QUESTIONS 1 & 2: 0
SUM OF ALL RESPONSES TO PHQ QUESTIONS 1-9: 0
SUM OF ALL RESPONSES TO PHQ QUESTIONS 1-9: 0

## 2023-01-31 ASSESSMENT — ENCOUNTER SYMPTOMS: BACK PAIN: 1

## 2023-02-01 DIAGNOSIS — G89.4 CHRONIC PAIN SYNDROME: Primary | ICD-10-CM

## 2023-02-23 ENCOUNTER — TELEPHONE (OUTPATIENT)
Dept: PRIMARY CARE CLINIC | Age: 68
End: 2023-02-23

## 2023-02-23 DIAGNOSIS — M25.511 RIGHT SHOULDER PAIN, UNSPECIFIED CHRONICITY: Primary | ICD-10-CM

## 2023-02-23 NOTE — TELEPHONE ENCOUNTER
----- Message from Odalis Vogt sent at 2/23/2023  8:05 AM EST -----  Subject: Message to Provider    QUESTIONS  Information for Provider? Pt called and stated that he needs to have   another referral to physical therapy for his shoulder. Pt states that PT   has been working on his back not his shoulder. Please all pt back and   inform if this has been updated  ---------------------------------------------------------------------------  --------------  Bigg Huff INFO  [phone]; OK to leave message on voicemail  ---------------------------------------------------------------------------  --------------  SCRIPT ANSWERS  Relationship to Patient?  Self

## 2023-02-23 NOTE — TELEPHONE ENCOUNTER
Pt would like referral for physical therapy for right shoulder pain. Pt uses [facility] in [place].     I have pended order for you to sign

## 2023-02-28 ENCOUNTER — OFFICE VISIT (OUTPATIENT)
Dept: PRIMARY CARE CLINIC | Age: 68
End: 2023-02-28
Payer: MEDICARE

## 2023-02-28 VITALS
SYSTOLIC BLOOD PRESSURE: 130 MMHG | TEMPERATURE: 98 F | BODY MASS INDEX: 33.33 KG/M2 | WEIGHT: 225 LBS | DIASTOLIC BLOOD PRESSURE: 70 MMHG | HEART RATE: 62 BPM | HEIGHT: 69 IN | OXYGEN SATURATION: 97 %

## 2023-02-28 DIAGNOSIS — I10 ESSENTIAL (PRIMARY) HYPERTENSION: Primary | ICD-10-CM

## 2023-02-28 DIAGNOSIS — E78.2 MIXED HYPERLIPIDEMIA: ICD-10-CM

## 2023-02-28 DIAGNOSIS — R73.01 IMPAIRED FASTING GLUCOSE: ICD-10-CM

## 2023-02-28 DIAGNOSIS — K43.9 HERNIA OF ABDOMINAL WALL: ICD-10-CM

## 2023-02-28 PROCEDURE — 3078F DIAST BP <80 MM HG: CPT | Performed by: INTERNAL MEDICINE

## 2023-02-28 PROCEDURE — 1123F ACP DISCUSS/DSCN MKR DOCD: CPT | Performed by: INTERNAL MEDICINE

## 2023-02-28 PROCEDURE — 3075F SYST BP GE 130 - 139MM HG: CPT | Performed by: INTERNAL MEDICINE

## 2023-02-28 PROCEDURE — 99214 OFFICE O/P EST MOD 30 MIN: CPT | Performed by: INTERNAL MEDICINE

## 2023-02-28 RX ORDER — LISINOPRIL 10 MG/1
10 TABLET ORAL 2 TIMES DAILY
Qty: 180 TABLET | Refills: 1 | Status: SHIPPED | OUTPATIENT
Start: 2023-02-28

## 2023-02-28 SDOH — ECONOMIC STABILITY: FOOD INSECURITY: WITHIN THE PAST 12 MONTHS, YOU WORRIED THAT YOUR FOOD WOULD RUN OUT BEFORE YOU GOT MONEY TO BUY MORE.: NEVER TRUE

## 2023-02-28 SDOH — ECONOMIC STABILITY: FOOD INSECURITY: WITHIN THE PAST 12 MONTHS, THE FOOD YOU BOUGHT JUST DIDN'T LAST AND YOU DIDN'T HAVE MONEY TO GET MORE.: NEVER TRUE

## 2023-02-28 SDOH — ECONOMIC STABILITY: HOUSING INSECURITY
IN THE LAST 12 MONTHS, WAS THERE A TIME WHEN YOU DID NOT HAVE A STEADY PLACE TO SLEEP OR SLEPT IN A SHELTER (INCLUDING NOW)?: NO

## 2023-02-28 SDOH — ECONOMIC STABILITY: INCOME INSECURITY: HOW HARD IS IT FOR YOU TO PAY FOR THE VERY BASICS LIKE FOOD, HOUSING, MEDICAL CARE, AND HEATING?: NOT HARD AT ALL

## 2023-02-28 NOTE — PROGRESS NOTES
Jurgen Carroll (:  1955) is a 78 y.o. male,Established patient, here for evaluation of the following chief complaint(s):  Hernia (Possible umbilical hernia. Has been bothersome over past few days)      Subjective   SUBJECTIVE/OBJECTIVE:  HPI  Hypertension:  Patient is here for follow up chronic hypertension. This is  generally controlled on current medication regimen. BP Readings from Last 1 Encounters:   23 130/70        Takes meds as directed and tolerates them well. Most recent labs reviewed with patient and are not remarkable. No symptoms from htn standpoint per ROS. Patient is  compliant with lifestyle modifications. Patient does not smoke. Comorbid conditions include obesity  Hyperlipidemia:  Patient is here to follow up regarding chronic hyperlipidemia. This is  generally controlled. Treatment includes Crestor patient is  compliant with lifestyle modifications. Patient is not a smoker. Most recent labs reviewed with patient today and are not remarkable. Comorbid conditions include obesity. Weak abdominal wall muscles causing discomfort want that taken care of  Review of Systems   All other systems reviewed and are negative.   CBC with Differential:    Lab Results   Component Value Date/Time    WBC 8.0 2023 03:28 PM    RBC 5.32 2023 03:28 PM    HGB 15.1 2023 03:28 PM    HCT 44.9 2023 03:28 PM     2023 03:28 PM    MCV 84.4 2023 03:28 PM    MCH 28.4 2023 03:28 PM    MCHC 33.6 2023 03:28 PM    RDW 12.9 2023 03:28 PM    LYMPHOPCT 33.1 2022 09:19 AM    MONOPCT 4.6 2022 09:19 AM    BASOPCT 0.3 2022 09:19 AM    MONOSABS 0.49 2022 09:19 AM    LYMPHSABS 3.51 2022 09:19 AM    EOSABS 0.03 2022 09:19 AM    BASOSABS 0.03 2022 09:19 AM     CMP:    Lab Results   Component Value Date/Time     2023 03:28 PM    K 4.8 2023 03:28 PM     2023 03:28 PM    CO2 22 01/25/2023 03:28 PM    BUN 17 01/25/2023 03:28 PM    CREATININE 1.1 01/25/2023 03:28 PM    GFRAA >60 09/22/2022 09:19 AM    LABGLOM >60 01/25/2023 03:28 PM    GLUCOSE 95 01/25/2023 03:28 PM    PROT 7.6 01/25/2023 03:28 PM    LABALBU 4.3 01/25/2023 03:28 PM    CALCIUM 9.7 01/25/2023 03:28 PM    BILITOT 0.9 01/25/2023 03:28 PM    ALKPHOS 73 01/25/2023 03:28 PM    AST 31 01/25/2023 03:28 PM    ALT 30 01/25/2023 03:28 PM     Lipid:   Lab Results   Component Value Date    CHOL 100 09/22/2022    CHOL 198 02/17/2022     Lab Results   Component Value Date    TRIG 72 09/22/2022    TRIG 77 02/17/2022     Lab Results   Component Value Date    HDL 39 01/25/2023    HDL 41 09/22/2022    HDL 43 02/17/2022     Lab Results   Component Value Date    LDLCALC 152 (H) 01/25/2023    LDLCALC 45 09/22/2022    LDLCALC 140 (H) 02/17/2022     Lab Results   Component Value Date    LABVLDL 17 01/25/2023    LABVLDL 14 09/22/2022    LABVLDL 15 02/17/2022     No results found for: CHOLHDLRATIO         Objective   /70   Pulse 62   Temp 98 °F (36.7 °C) (Temporal)   Ht 5' 9\" (1.753 m)   Wt 225 lb (102.1 kg)   SpO2 97%   BMI 33.23 kg/m²   Current Outpatient Medications   Medication Sig Dispense Refill    lisinopril (PRINIVIL;ZESTRIL) 10 MG tablet Take 1 tablet by mouth in the morning and at bedtime 180 tablet 1    HYDROcodone-acetaminophen (NORCO) 5-325 MG per tablet TAKE ONE TABLET BY MOUTH EVERY DAY AS NEEDED FOR 30 DAYS      rosuvastatin (CRESTOR) 20 MG tablet Take 1 tablet by mouth nightly 90 tablet 1    metoprolol succinate (TOPROL XL) 25 MG extended release tablet Take 0.5 tablets by mouth every evening 30 tablet 3    aspirin 81 MG EC tablet Take 81 mg by mouth daily      tiZANidine (ZANAFLEX) 4 MG tablet TAKE ONE TABLET BY MOUTH TWO TIMES A DAY AS NEEDED FOR 30 DAYS      meloxicam (MOBIC) 7.5 MG tablet TAKE 1 TABLET BY MOUTH ONCE A DAY WITH A MEAL (Patient not taking: Reported on 2/28/2023)      DULoxetine (CYMBALTA) 60 MG extended release capsule TAKE ONE CAPSULE BY MOUTH EVERY MORNING (Patient not taking: No sig reported)      QUEtiapine (SEROQUEL) 25 MG tablet TAKE ONE TO TWO TABLETS BY MOUTH EVERY NIGHT AT BEDTIME (Patient not taking: No sig reported)       No current facility-administered medications for this visit. No Known Allergies     Past Medical History:   Diagnosis Date    Anxiety     Arthritis     Back pain     Depression     GERD (gastroesophageal reflux disease)     HFrEF (heart failure with reduced ejection fraction) (Formerly [facility])     History of blood transfusion     Hyperlipidemia     Hypertension     Nonischemic cardiomyopathy ([address]     Sleep apnea     has cpap     Past Surgical History:   Procedure Laterality Date    APPENDECTOMY      COLONOSCOPY  2010    EYE SURGERY      laser surgery corrective    HERNIA REPAIR  2001    abd hiatal hernia ?     JOINT REPLACEMENT Left 6/8/16    knee    PROSTHET DEVICE APPLICATION N/A 5/18/0531    CYSTOSCOPY RETROGRADE PYELOGRAM, URETHROSCOPY WITH INSERTION OF PERMANENT ADJUSTABLE TRANS PROSTATIC IMPLANT performed by Dora Coyne MD at [address] [place] [address] Left     bicep surgery     Family History   Problem Relation Age of Onset    Diabetes Mother     Heart Disease Mother     Other Father         parkinsons    Cancer Sister       Social History     Socioeconomic History    Marital status:      Spouse name: Not on file    Number of children: Not on file    Years of education: Not on file    Highest education level: Not on file   Occupational History    Not on file   Tobacco Use    Smoking status: Never    Smokeless tobacco: Never   Vaping Use    Vaping Use: Never used   Substance and Sexual Activity    Alcohol use: Not Currently     Comment: rare    Drug use: Never    Sexual activity: Not on file   Other Topics Concern    Not on file   Social History Narrative    Drinks 6-8 iced tea or pop daily     Social Determinants of Health     Financial Resource Strain: Low Risk Difficulty of Paying Living Expenses: Not hard at all   Food Insecurity: No Food Insecurity    Worried About [facility] in the Last Year: Never true    Ran Out of Food in the Last Year: Never true   Transportation Needs: Unknown    Lack of Transportation (Medical): Not on file    Lack of Transportation (Non-Medical): No   Physical Activity: Not on file   Stress: Not on file   Social Connections: Not on file   Intimate Partner Violence: Not on file   Housing Stability: Unknown    Unable to Pay for Housing in the Last Year: Not on file    Number of Places Lived in the Last Year: Not on file    Unstable Housing in the Last Year: No      Health Maintenance Due   Topic Date Due    Hepatitis C screen  Never done    Shingles vaccine (1 of 2) Never done    COVID-19 Vaccine (3 - Booster for Dempsey Peter series) 09/09/2021    DTaP/Tdap/Td vaccine (2 - Td or Tdap) 10/18/2022    Pneumococcal 65+ years Vaccine (2 - PPSV23 if available, else PCV20) 11/08/2022    Annual Wellness Visit (AWV)  02/15/2023    [facility]    Physical Exam  Constitutional:       General: He is not in acute distress. Appearance: Normal appearance. He is obese. HENT:      Head: Normocephalic and atraumatic. Right Ear: Tympanic membrane, ear canal and external ear normal.      Left Ear: Tympanic membrane, ear canal and external ear normal. There is no impacted cerumen. Nose: Nose normal. No congestion or rhinorrhea. Mouth/Throat:      Mouth: Mucous membranes are moist.      Pharynx: Oropharynx is clear. No oropharyngeal exudate or posterior oropharyngeal erythema. Eyes:      General: No scleral icterus. Right eye: No discharge. Left eye: No discharge. Pupils: Pupils are equal, round, and reactive to light. Neck:      Vascular: No carotid bruit. Cardiovascular:      Rate and Rhythm: Normal rate and regular rhythm. Pulses: Normal pulses. Heart sounds: No murmur heard. No gallop.    Pulmonary: Effort: Pulmonary effort is normal. No respiratory distress. Breath sounds: Normal breath sounds. No wheezing, rhonchi or rales. Chest:      Chest wall: No tenderness. Abdominal:      General: Bowel sounds are normal.      Palpations: Abdomen is soft. There is no mass. Tenderness: There is no abdominal tenderness. There is no guarding. Hernia: No hernia is present. Comments: Bulging abdominal wall    Musculoskeletal:         General: No swelling, tenderness, deformity or signs of injury. Cervical back: Normal range of motion and neck supple. No rigidity or tenderness. Right lower leg: No edema. Left lower leg: No edema. Lymphadenopathy:      Cervical: No cervical adenopathy. Skin:     General: Skin is warm and dry. Capillary Refill: Capillary refill takes 2 to 3 seconds. Findings: No bruising, lesion or rash. Neurological:      General: No focal deficit present. Mental Status: He is alert and oriented to person, place, and time. Sensory: No sensory deficit. Motor: No weakness. Gait: Gait normal.   Psychiatric:         Mood and Affect: Mood normal.         Behavior: Behavior normal.         Thought Content: Thought content normal.         Judgment: Judgment normal.             ASSESSMENT/PLAN:  1. Essential (primary) hypertension  -     lisinopril (PRINIVIL;ZESTRIL) 10 MG tablet; Take 1 tablet by mouth in the morning and at bedtime, Disp-180 tablet, R-1Normal  -     CBC; Future  -     Comprehensive Metabolic Panel; Future  2. Mixed hyperlipidemia  -     Lipid, Fasting; Future  3. Impaired fasting glucose  -     Hemoglobin A1C; Future  4. Hernia of abdominal wall  -     Halima Berger MD, General Surgery, [place]      No follow-ups on file. An electronic signature was used to authenticate this note.     --Sherman Burks MD

## 2023-03-15 ENCOUNTER — OFFICE VISIT (OUTPATIENT)
Dept: SURGERY | Age: 68
End: 2023-03-15

## 2023-03-15 VITALS
HEIGHT: 69 IN | BODY MASS INDEX: 33.62 KG/M2 | RESPIRATION RATE: 16 BRPM | WEIGHT: 227 LBS | HEART RATE: 88 BPM | SYSTOLIC BLOOD PRESSURE: 128 MMHG | DIASTOLIC BLOOD PRESSURE: 81 MMHG

## 2023-03-15 DIAGNOSIS — M62.08 DIASTASIS RECTI: Primary | ICD-10-CM

## 2023-03-15 NOTE — PROGRESS NOTES
Patient's Name/Date of Birth: Geo Dec / 1955    Date: 3/15/2023    PCP: Fatoumata Robles MD    Chief Complaint   Patient presents with    New Patient    Hernia       HPI:  Patient sen for evaluation of possible abdominal wall hernia. He states noticed \"bulge\" in epigastric region for ABOUT 2 MONTHS. sOME MILD PAIN, NO n&v, NO CHANGE IN BOWEL HABITS. nO hX OF INJURY    Patient's medications, allergies, past medical, surgical, social and family histories were reviewed and updated as appropriate. No Known Allergies    Past Medical History:   Diagnosis Date    Anxiety     Arthritis     Back pain     Depression     GERD (gastroesophageal reflux disease)     HFrEF (heart failure with reduced ejection fraction) ([facility])     History of blood transfusion     Hyperlipidemia     Hypertension     Nonischemic cardiomyopathy ([address]     Sleep apnea     has cpap        Past Surgical History:   Procedure Laterality Date    APPENDECTOMY      COLONOSCOPY  2010    EYE SURGERY      laser surgery corrective    HERNIA REPAIR  2001    abd hiatal hernia ?     JOINT REPLACEMENT Left 6/8/16    knee    PROSTHET DEVICE APPLICATION N/A 6/11/7911    CYSTOSCOPY RETROGRADE PYELOGRAM, URETHROSCOPY WITH INSERTION OF PERMANENT ADJUSTABLE TRANS PROSTATIC IMPLANT performed by Catarina Gardiner MD at [facility] Left     bicep surgery        Social History     Tobacco Use    Smoking status: Never    Smokeless tobacco: Never   Substance Use Topics    Alcohol use: Not Currently     Comment: rare       Current Outpatient Medications   Medication Sig Dispense Refill    lisinopril (PRINIVIL;ZESTRIL) 10 MG tablet Take 1 tablet by mouth in the morning and at bedtime 180 tablet 1    HYDROcodone-acetaminophen (NORCO) 5-325 MG per tablet TAKE ONE TABLET BY MOUTH EVERY DAY AS NEEDED FOR 30 DAYS      rosuvastatin (CRESTOR) 20 MG tablet Take 1 tablet by mouth nightly 90 tablet 1    metoprolol succinate (TOPROL XL) 25 MG extended release tablet Take 0.5 tablets by mouth every evening 30 tablet 3    aspirin 81 MG EC tablet Take 81 mg by mouth daily      tiZANidine (ZANAFLEX) 4 MG tablet TAKE ONE TABLET BY MOUTH TWO TIMES A DAY AS NEEDED FOR 30 DAYS      DULoxetine (CYMBALTA) 60 MG extended release capsule TAKE ONE CAPSULE BY MOUTH EVERY MORNING       No current facility-administered medications for this visit. Labs:      Review of Systems  Constitutional: negative  Eyes: negative  Ears, nose, mouth, throat, and face: negative  Respiratory: negative  Cardiovascular: negative  Gastrointestinal: negative  Genitourinary:negative  Integument/breast: negative  Hematologic/lymphatic: negative  Musculoskeletal:negative  Neurological: negative  Allergic/Immunologic: negative    Physical exam:  /81   Pulse 88   Resp 16   Ht 5' 9\" (1.753 m)   Wt 227 lb (103 kg)   BMI 33.52 kg/m²   General appearance: no acute distress  Head:NCAT, EOMI, PERRLA, conjunctiva pink  Neck: no masses, supple  Lungs: CTABL  Heart: RRR  Abdomen: soft, nondistended, nontender, no guarding, no peritoneal signs, normoactive bowel sounds dIASTESIS RECTI NOTED, NO HERNIA  Extremities:no edema    Assessment/Plan:  DIASTESIS RECTI  cONSERVATIVE MANGEMENT: WEIGHT LOSS, EXERCISE  CONTINUE TO MONITOR    No follow-ups on file.     Herrera Redmond MD      Send copy of H&P to PCP, Prateek Johnson MD

## 2023-03-20 ENCOUNTER — OFFICE VISIT (OUTPATIENT)
Dept: PRIMARY CARE CLINIC | Age: 68
End: 2023-03-20
Payer: MEDICARE

## 2023-03-20 VITALS
SYSTOLIC BLOOD PRESSURE: 190 MMHG | OXYGEN SATURATION: 93 % | HEIGHT: 69 IN | HEART RATE: 122 BPM | TEMPERATURE: 99.8 F | WEIGHT: 225 LBS | DIASTOLIC BLOOD PRESSURE: 102 MMHG | BODY MASS INDEX: 33.33 KG/M2

## 2023-03-20 DIAGNOSIS — I10 ESSENTIAL (PRIMARY) HYPERTENSION: ICD-10-CM

## 2023-03-20 DIAGNOSIS — J20.9 ACUTE BRONCHITIS, UNSPECIFIED ORGANISM: Primary | ICD-10-CM

## 2023-03-20 PROCEDURE — 1123F ACP DISCUSS/DSCN MKR DOCD: CPT | Performed by: INTERNAL MEDICINE

## 2023-03-20 PROCEDURE — 3080F DIAST BP >= 90 MM HG: CPT | Performed by: INTERNAL MEDICINE

## 2023-03-20 PROCEDURE — 3077F SYST BP >= 140 MM HG: CPT | Performed by: INTERNAL MEDICINE

## 2023-03-20 PROCEDURE — 99213 OFFICE O/P EST LOW 20 MIN: CPT | Performed by: INTERNAL MEDICINE

## 2023-03-20 RX ORDER — PREDNISONE 20 MG/1
20 TABLET ORAL DAILY
Qty: 10 TABLET | Refills: 0 | Status: SHIPPED | OUTPATIENT
Start: 2023-03-20 | End: 2023-03-30

## 2023-03-20 RX ORDER — AMOXICILLIN AND CLAVULANATE POTASSIUM 875; 125 MG/1; MG/1
1 TABLET, FILM COATED ORAL 2 TIMES DAILY
Qty: 14 TABLET | Refills: 0 | Status: SHIPPED | OUTPATIENT
Start: 2023-03-20 | End: 2023-03-27

## 2023-03-20 RX ORDER — DEXTROMETHORPHAN HYDROBROMIDE AND PROMETHAZINE HYDROCHLORIDE 15; 6.25 MG/5ML; MG/5ML
5 SYRUP ORAL 4 TIMES DAILY PRN
Qty: 60 ML | Refills: 1 | Status: SHIPPED | OUTPATIENT
Start: 2023-03-20 | End: 2023-03-27

## 2023-03-20 NOTE — LETTER
March 20, 2023       Yamilex Wyman YOB: 1955   [address] [facility] Date of Visit:  3/20/2023       To Whom It May Concern: It is my medical opinion that Felecia Cooney may return to work on 3/27/23. If you have any questions or concerns, please don't hesitate to call.     Sincerely,        Jagdish Waldron MD
Plan: Start clobetasol ointment \\nStart Cerave moisturizing cream qd, coupons given today\\nDiscussed LBL and Efudex.   Has tried Efudex in past and was unhappy with PIH\\nAdvised [facility]H s/e of most therapies\\nWill try LBL if no improvement at 3 month exam.
Detail Level: Zone

## 2023-03-20 NOTE — PROGRESS NOTES
Out of Food in the Last Year: Never true    Ran Out of Food in the Last Year: Never true   Transportation Needs: Unknown    Lack of Transportation (Medical): Not on file    Lack of Transportation (Non-Medical): No   Physical Activity: Not on file   Stress: Not on file   Social Connections: Not on file   Intimate Partner Violence: Not on file   Housing Stability: Unknown    Unable to Pay for Housing in the Last Year: Not on file    Number of Places Lived in the Last Year: Not on file    Unstable Housing in the Last Year: No      Health Maintenance Due   Topic Date Due    Hepatitis C screen  Never done    Shingles vaccine (1 of 2) Never done    COVID-19 Vaccine (3 - Booster for Dempsey Peter series) 09/09/2021    DTaP/Tdap/Td vaccine (2 - Td or Tdap) 10/18/2022    Pneumococcal 65+ years Vaccine (2 - PPSV23 if available, else PCV20) 11/08/2022    Annual Wellness Visit (AWV)  02/15/2023    [facility]    Physical Exam  Constitutional:       General: He is not in acute distress. Appearance: Normal appearance. He is normal weight. HENT:      Head: Normocephalic and atraumatic. Right Ear: Tympanic membrane, ear canal and external ear normal.      Left Ear: Tympanic membrane, ear canal and external ear normal. There is no impacted cerumen. Nose: Congestion present. No rhinorrhea. Mouth/Throat:      Mouth: Mucous membranes are moist.      Pharynx: Oropharyngeal exudate present. No posterior oropharyngeal erythema. Eyes:      General: No scleral icterus. Right eye: No discharge. Left eye: No discharge. Pupils: Pupils are equal, round, and reactive to light. Neck:      Vascular: No carotid bruit. Cardiovascular:      Rate and Rhythm: Normal rate and regular rhythm. Pulses: Normal pulses. Heart sounds: No murmur heard. No gallop. Pulmonary:      Effort: Pulmonary effort is normal. No respiratory distress. Breath sounds: Normal breath sounds. No wheezing, rhonchi or rales.

## 2023-03-24 DIAGNOSIS — I10 ESSENTIAL (PRIMARY) HYPERTENSION: Primary | ICD-10-CM

## 2023-03-24 RX ORDER — HYDROCHLOROTHIAZIDE 12.5 MG/1
12.5 CAPSULE, GELATIN COATED ORAL EVERY MORNING
Qty: 90 CAPSULE | Refills: 1 | Status: SHIPPED | OUTPATIENT
Start: 2023-03-24

## 2023-05-12 DIAGNOSIS — R73.01 IMPAIRED FASTING GLUCOSE: ICD-10-CM

## 2023-05-12 DIAGNOSIS — E78.2 MIXED HYPERLIPIDEMIA: ICD-10-CM

## 2023-05-12 DIAGNOSIS — I10 ESSENTIAL (PRIMARY) HYPERTENSION: ICD-10-CM

## 2023-05-12 LAB
ALBUMIN SERPL-MCNC: 4.3 G/DL (ref 3.5–5.2)
ALP SERPL-CCNC: 62 U/L (ref 40–129)
ALT SERPL-CCNC: 33 U/L (ref 0–40)
ANION GAP SERPL CALCULATED.3IONS-SCNC: 15 MMOL/L (ref 7–16)
AST SERPL-CCNC: 27 U/L (ref 0–39)
BILIRUB SERPL-MCNC: 0.7 MG/DL (ref 0–1.2)
BUN SERPL-MCNC: 17 MG/DL (ref 6–23)
CALCIUM SERPL-MCNC: 10 MG/DL (ref 8.6–10.2)
CHLORIDE SERPL-SCNC: 103 MMOL/L (ref 98–107)
CHOLESTEROL, FASTING: 116 MG/DL (ref 0–199)
CO2 SERPL-SCNC: 22 MMOL/L (ref 22–29)
CREAT SERPL-MCNC: 1.1 MG/DL (ref 0.7–1.2)
ERYTHROCYTE [DISTWIDTH] IN BLOOD BY AUTOMATED COUNT: 12.5 FL (ref 11.5–15)
GLUCOSE SERPL-MCNC: 91 MG/DL (ref 74–99)
HBA1C MFR BLD: 6 % (ref 4–5.6)
HCT VFR BLD AUTO: 43 % (ref 37–54)
HDLC SERPL-MCNC: 34 MG/DL
HGB BLD-MCNC: 14 G/DL (ref 12.5–16.5)
LDL CHOLESTEROL CALCULATED: 65 MG/DL (ref 0–99)
MCH RBC QN AUTO: 28.7 PG (ref 26–35)
MCHC RBC AUTO-ENTMCNC: 32.6 % (ref 32–34.5)
MCV RBC AUTO: 88.3 FL (ref 80–99.9)
PLATELET # BLD AUTO: 210 E9/L (ref 130–450)
PMV BLD AUTO: 10.8 FL (ref 7–12)
POTASSIUM SERPL-SCNC: 4.2 MMOL/L (ref 3.5–5)
PROT SERPL-MCNC: 7.2 G/DL (ref 6.4–8.3)
RBC # BLD AUTO: 4.87 E12/L (ref 3.8–5.8)
SODIUM SERPL-SCNC: 140 MMOL/L (ref 132–146)
TRIGLYCERIDE, FASTING: 86 MG/DL (ref 0–149)
VLDLC SERPL CALC-MCNC: 17 MG/DL
WBC # BLD: 8.4 E9/L (ref 4.5–11.5)

## 2023-05-13 SDOH — ECONOMIC STABILITY: INCOME INSECURITY: HOW HARD IS IT FOR YOU TO PAY FOR THE VERY BASICS LIKE FOOD, HOUSING, MEDICAL CARE, AND HEATING?: NOT HARD AT ALL

## 2023-05-13 SDOH — ECONOMIC STABILITY: TRANSPORTATION INSECURITY
IN THE PAST 12 MONTHS, HAS LACK OF TRANSPORTATION KEPT YOU FROM MEETINGS, WORK, OR FROM GETTING THINGS NEEDED FOR DAILY LIVING?: NO

## 2023-05-13 SDOH — ECONOMIC STABILITY: FOOD INSECURITY: WITHIN THE PAST 12 MONTHS, YOU WORRIED THAT YOUR FOOD WOULD RUN OUT BEFORE YOU GOT MONEY TO BUY MORE.: NEVER TRUE

## 2023-05-13 SDOH — HEALTH STABILITY: PHYSICAL HEALTH: ON AVERAGE, HOW MANY DAYS PER WEEK DO YOU ENGAGE IN MODERATE TO STRENUOUS EXERCISE (LIKE A BRISK WALK)?: 6 DAYS

## 2023-05-13 SDOH — ECONOMIC STABILITY: FOOD INSECURITY: WITHIN THE PAST 12 MONTHS, THE FOOD YOU BOUGHT JUST DIDN'T LAST AND YOU DIDN'T HAVE MONEY TO GET MORE.: NEVER TRUE

## 2023-05-13 ASSESSMENT — LIFESTYLE VARIABLES
HOW OFTEN DO YOU HAVE A DRINK CONTAINING ALCOHOL: 2-4 TIMES A MONTH
HOW MANY STANDARD DRINKS CONTAINING ALCOHOL DO YOU HAVE ON A TYPICAL DAY: 1 OR 2
HOW MANY STANDARD DRINKS CONTAINING ALCOHOL DO YOU HAVE ON A TYPICAL DAY: 1
HOW OFTEN DO YOU HAVE A DRINK CONTAINING ALCOHOL: 3

## 2023-05-13 ASSESSMENT — PATIENT HEALTH QUESTIONNAIRE - PHQ9
SUM OF ALL RESPONSES TO PHQ QUESTIONS 1-9: 0
SUM OF ALL RESPONSES TO PHQ QUESTIONS 1-9: 0
2. FEELING DOWN, DEPRESSED OR HOPELESS: 0
SUM OF ALL RESPONSES TO PHQ QUESTIONS 1-9: 0
SUM OF ALL RESPONSES TO PHQ9 QUESTIONS 1 & 2: 0
1. LITTLE INTEREST OR PLEASURE IN DOING THINGS: 0
SUM OF ALL RESPONSES TO PHQ QUESTIONS 1-9: 0

## 2023-05-16 ENCOUNTER — OFFICE VISIT (OUTPATIENT)
Dept: PRIMARY CARE CLINIC | Age: 68
End: 2023-05-16
Payer: MEDICARE

## 2023-05-16 VITALS
HEIGHT: 69 IN | WEIGHT: 226 LBS | DIASTOLIC BLOOD PRESSURE: 90 MMHG | OXYGEN SATURATION: 96 % | TEMPERATURE: 97.3 F | SYSTOLIC BLOOD PRESSURE: 136 MMHG | HEART RATE: 78 BPM | BODY MASS INDEX: 33.47 KG/M2

## 2023-05-16 DIAGNOSIS — R73.01 IMPAIRED FASTING GLUCOSE: ICD-10-CM

## 2023-05-16 DIAGNOSIS — G47.33 OSA (OBSTRUCTIVE SLEEP APNEA): ICD-10-CM

## 2023-05-16 DIAGNOSIS — I42.8 NICM (NONISCHEMIC CARDIOMYOPATHY) (HCC): ICD-10-CM

## 2023-05-16 DIAGNOSIS — M47.16 SPONDYLOSIS, LUMBAR, WITH MYELOPATHY: ICD-10-CM

## 2023-05-16 DIAGNOSIS — Z00.00 MEDICARE ANNUAL WELLNESS VISIT, SUBSEQUENT: Primary | ICD-10-CM

## 2023-05-16 DIAGNOSIS — I10 ESSENTIAL (PRIMARY) HYPERTENSION: ICD-10-CM

## 2023-05-16 DIAGNOSIS — E78.2 MIXED HYPERLIPIDEMIA: ICD-10-CM

## 2023-05-16 PROCEDURE — 3075F SYST BP GE 130 - 139MM HG: CPT | Performed by: INTERNAL MEDICINE

## 2023-05-16 PROCEDURE — 99214 OFFICE O/P EST MOD 30 MIN: CPT | Performed by: INTERNAL MEDICINE

## 2023-05-16 PROCEDURE — 1123F ACP DISCUSS/DSCN MKR DOCD: CPT | Performed by: INTERNAL MEDICINE

## 2023-05-16 PROCEDURE — G0439 PPPS, SUBSEQ VISIT: HCPCS | Performed by: INTERNAL MEDICINE

## 2023-05-16 PROCEDURE — 3078F DIAST BP <80 MM HG: CPT | Performed by: INTERNAL MEDICINE

## 2023-05-16 RX ORDER — IBUPROFEN 600 MG/1
600 TABLET ORAL 4 TIMES DAILY PRN
Qty: 180 TABLET | Refills: 1 | Status: SHIPPED | OUTPATIENT
Start: 2023-05-16

## 2023-05-16 NOTE — PROGRESS NOTES
Medicare Annual Wellness Visit    Lauren Trevino is here for Medicare AWV    Assessment & Plan   Medicare annual wellness visit, subsequent  Essential (primary) hypertension  Mixed hyperlipidemia  Impaired fasting glucose  CASIE (obstructive sleep apnea)  NICM (nonischemic cardiomyopathy) ([facility]    Recommendations for Preventive Services Due: see orders and patient instructions/AVS.  Recommended screening schedule for the next 5-10 years is provided to the patient in written form: see Patient Instructions/AVS.     Return for Medicare Annual Wellness Visit in 1 year. Subjective   The following acute and/or chronic problems were also addressed today:  HTN, hyperglycemia hyperlipidemia obstructive sleep apnea    Patient's complete Health Risk Assessment and screening values have been reviewed and are found in Flowsheets. The following problems were reviewed today and where indicated follow up appointments were made and/or referrals ordered. Positive Risk Factor Screenings with Interventions:                Opioid Risk: (Low risk score <55) Opioid risk score: 11    Patient is low risk for opioid use disorder or overdose. Last PDMP Gideon as Reviewed:  Review User Review Instant Review Result                General HRA Questions:  Select all that apply: (!) New or Increased Pain, New or Increased Fatigue    Pain Interventions:  Patient comments: Has been to pain management he will go back as back pain is getting worse again    Fatigue Interventions:  Patient comments: Still there he has obstructive sleep apnea he cannot use the mask he has an adjustable bed and that helps.   Will refer to ENT       Weight and Activity:  Physical Activity: Unknown    Days of Exercise per Week: 6 days    Minutes of Exercise per Session: Not on file     On average, how many days per week do you engage in moderate to strenuous exercise (like a brisk walk)?: 6 days  Have you lost any weight without trying in the past 3 months?: (!)

## 2023-05-16 NOTE — PROGRESS NOTES
Brenden Walker (:  1955) is a 78 y.o. male,Established patient, here for evaluation of the following chief complaint(s):  Medicare AWV      Subjective   SUBJECTIVE/OBJECTIVE:  HPI  Back pain worsening: Has been following up with pain clinic his back pain is getting worse. Needs pain medications. He has been taking ibuprofen. Creatinine is okay. Ibuprofen helps symptoms  Hypertension:  Patient is here for follow up chronic hypertension. This is  generally controlled on current medication regimen. BP Readings from Last 1 Encounters:   23 (!) 136/90        Takes meds as directed and tolerates them well. Most recent labs reviewed with patient and are not remarkable. No symptoms from htn standpoint per ROS. Patient is  compliant with lifestyle modifications. Patient does not smoke. Comorbid conditions include obesity  Hyperlipidemia:  Patient is here to follow up regarding chronic hyperlipidemia. This is  generally controlled. Treatment includes Crestor. Patient is  compliant with lifestyle modifications. Patient is not a smoker. Most recent labs reviewed with patient today and are not remarkable. Comorbid conditions include obesity. Review of Systems   All other systems reviewed and are negative.   CBC with Differential:    Lab Results   Component Value Date/Time    WBC 8.4 2023 03:36 PM    RBC 4.87 2023 03:36 PM    HGB 14.0 2023 03:36 PM    HCT 43.0 2023 03:36 PM     2023 03:36 PM    MCV 88.3 2023 03:36 PM    MCH 28.7 2023 03:36 PM    MCHC 32.6 2023 03:36 PM    RDW 12.5 2023 03:36 PM    LYMPHOPCT 33.1 2022 09:19 AM    MONOPCT 4.6 2022 09:19 AM    BASOPCT 0.3 2022 09:19 AM    MONOSABS 0.49 2022 09:19 AM    LYMPHSABS 3.51 2022 09:19 AM    EOSABS 0.03 2022 09:19 AM    BASOSABS 0.03 2022 09:19 AM     CMP:    Lab Results   Component Value Date/Time     2023 03:36 PM    K

## 2023-05-18 ENCOUNTER — TELEPHONE (OUTPATIENT)
Dept: PRIMARY CARE CLINIC | Age: 68
End: 2023-05-18

## 2023-05-18 DIAGNOSIS — G47.33 OSA (OBSTRUCTIVE SLEEP APNEA): Primary | ICD-10-CM

## 2023-05-18 NOTE — TELEPHONE ENCOUNTER
Dr Martha Schrader office called stating pt does not meet qualifications for inspire surgery. Would need to see sleep medicine.  They are expecting referral.

## 2023-05-19 DIAGNOSIS — G47.33 OBSTRUCTIVE SLEEP APNEA (ADULT) (PEDIATRIC): Primary | ICD-10-CM

## 2023-07-24 ASSESSMENT — ENCOUNTER SYMPTOMS
FACIAL SWELLING: 0
GASTROINTESTINAL NEGATIVE: 1
EYES NEGATIVE: 1
EYE DISCHARGE: 0
ALLERGIC/IMMUNOLOGIC NEGATIVE: 1
RHINORRHEA: 0
SINUS PAIN: 0
RESPIRATORY NEGATIVE: 1
EYE PAIN: 0
EYE ITCHING: 0
EYE REDNESS: 0

## 2023-07-24 NOTE — PROGRESS NOTES
Benito Gomez (:  1955) is a 78 y.o. male,Established patient, here for evaluation of the following chief complaint(s):  Check-Up      Subjective   SUBJECTIVE/OBJECTIVE:  HPI  Hypertension:  Patient is here for follow up chronic hypertension. This is  generally controlled on current medication regimen. BP Readings from Last 1 Encounters:   23 105/78        Takes meds as directed and tolerates them well. Most recent labs reviewed with patient and are not remarkable. No symptoms from htn standpoint per ROS. Patient is  compliant with lifestyle modifications. Patient does not smoke. Comorbid conditions include obesity  Sleep Apnea  Not using CPAP has an adjustable bed    Hyperlipidemia:  Patient is here to follow up regarding chronic hyperlipidemia. This is  generally controlled. Treatment includes simvastatin. Patient is  compliant with lifestyle modifications. Patient is not a smoker. Most recent labs reviewed with patient today and are not remarkable. Comorbid conditions include obesity. Review of Systems   Constitutional:  Positive for fatigue. Negative for activity change, appetite change, chills and fever. HENT: Negative. Negative for congestion, ear pain, facial swelling, hearing loss, postnasal drip, rhinorrhea and sinus pain. Eyes: Negative. Negative for pain, discharge, redness and itching. Respiratory: Negative. Cardiovascular: Negative. Gastrointestinal: Negative. Endocrine: Negative. Genitourinary: Negative. Musculoskeletal: Negative. Skin: Negative. Allergic/Immunologic: Negative. Neurological: Negative. Hematological: Negative. Psychiatric/Behavioral: Negative.      CBC with Differential:    Lab Results   Component Value Date/Time    WBC 8.4 2023 03:36 PM    RBC 4.87 2023 03:36 PM    HGB 14.0 2023 03:36 PM    HCT 43.0 2023 03:36 PM     2023 03:36 PM    MCV 88.3 2023 03:36 PM    MCH 28.7

## 2023-08-16 ENCOUNTER — HOSPITAL ENCOUNTER (OUTPATIENT)
Dept: CARDIOLOGY | Age: 68
Discharge: HOME | End: 2023-08-16
Payer: MEDICARE

## 2023-08-16 DIAGNOSIS — I42.8 NICM (NONISCHEMIC CARDIOMYOPATHY) (HCC): ICD-10-CM

## 2023-08-16 LAB
LV EF: 58 %
LVEF MODALITY: NORMAL

## 2023-08-16 PROCEDURE — 93306 TTE W/DOPPLER COMPLETE: CPT

## 2023-08-21 ENCOUNTER — OFFICE VISIT (OUTPATIENT)
Dept: PRIMARY CARE CLINIC | Age: 68
End: 2023-08-21
Payer: MEDICARE

## 2023-08-21 VITALS
SYSTOLIC BLOOD PRESSURE: 105 MMHG | HEART RATE: 101 BPM | WEIGHT: 220.9 LBS | HEIGHT: 69 IN | TEMPERATURE: 98 F | OXYGEN SATURATION: 96 % | DIASTOLIC BLOOD PRESSURE: 78 MMHG | BODY MASS INDEX: 32.72 KG/M2

## 2023-08-21 DIAGNOSIS — I50.20 HFREF (HEART FAILURE WITH REDUCED EJECTION FRACTION) (HCC): Primary | ICD-10-CM

## 2023-08-21 DIAGNOSIS — E78.2 MIXED HYPERLIPIDEMIA: ICD-10-CM

## 2023-08-21 DIAGNOSIS — G47.33 OSA (OBSTRUCTIVE SLEEP APNEA): ICD-10-CM

## 2023-08-21 DIAGNOSIS — I42.8 NICM (NONISCHEMIC CARDIOMYOPATHY) (HCC): ICD-10-CM

## 2023-08-21 DIAGNOSIS — R73.01 IMPAIRED FASTING GLUCOSE: ICD-10-CM

## 2023-08-21 DIAGNOSIS — I10 ESSENTIAL (PRIMARY) HYPERTENSION: ICD-10-CM

## 2023-08-21 PROCEDURE — 3074F SYST BP LT 130 MM HG: CPT | Performed by: INTERNAL MEDICINE

## 2023-08-21 PROCEDURE — 1123F ACP DISCUSS/DSCN MKR DOCD: CPT | Performed by: INTERNAL MEDICINE

## 2023-08-21 PROCEDURE — 99214 OFFICE O/P EST MOD 30 MIN: CPT | Performed by: INTERNAL MEDICINE

## 2023-08-21 PROCEDURE — 3078F DIAST BP <80 MM HG: CPT | Performed by: INTERNAL MEDICINE

## 2023-08-21 RX ORDER — METOPROLOL SUCCINATE 25 MG/1
12.5 TABLET, EXTENDED RELEASE ORAL EVERY EVENING
Qty: 45 TABLET | Refills: 1 | Status: SHIPPED | OUTPATIENT
Start: 2023-08-21

## 2023-08-22 PROBLEM — E66.8 MODERATE OBESITY: Status: ACTIVE | Noted: 2023-08-22

## 2023-08-22 PROBLEM — E66.9 MODERATE OBESITY: Status: ACTIVE | Noted: 2023-08-22

## 2023-08-22 PROBLEM — I50.42 CHRONIC COMBINED SYSTOLIC AND DIASTOLIC HF (HEART FAILURE) (HCC): Status: ACTIVE | Noted: 2023-08-22

## 2023-08-22 PROBLEM — I25.10 CORONARY ARTERY DISEASE INVOLVING NATIVE CORONARY ARTERY OF NATIVE HEART WITHOUT ANGINA PECTORIS: Status: ACTIVE | Noted: 2023-08-22

## 2023-08-23 DIAGNOSIS — E78.2 MIXED HYPERLIPIDEMIA: ICD-10-CM

## 2023-08-23 RX ORDER — ROSUVASTATIN CALCIUM 20 MG/1
TABLET, COATED ORAL
Qty: 90 TABLET | Refills: 1 | Status: SHIPPED | OUTPATIENT
Start: 2023-08-23

## 2023-09-19 ASSESSMENT — ENCOUNTER SYMPTOMS
EYE ITCHING: 0
RHINORRHEA: 0
SINUS PAIN: 0
EYES NEGATIVE: 1
ALLERGIC/IMMUNOLOGIC NEGATIVE: 1
FACIAL SWELLING: 0
EYE REDNESS: 0
EYE PAIN: 0
EYE DISCHARGE: 0
GASTROINTESTINAL NEGATIVE: 1
RESPIRATORY NEGATIVE: 1

## 2023-09-19 NOTE — PROGRESS NOTES
Future  -     Comprehensive Metabolic Panel; Future  2. Mixed hyperlipidemia  -     Comprehensive Metabolic Panel; Future  -     Lipid, Fasting; Future  3. Obstructive sleep apnea  4. Impaired fasting glucose  -     Hemoglobin A1C; Future  5. Chronic skin ulcer, limited to breakdown of skin [facility])  -     External Referral To Dermatology      No follow-ups on file. An electronic signature was used to authenticate this note.     --Warren Ryan MD

## 2023-09-20 ENCOUNTER — OFFICE VISIT (OUTPATIENT)
Dept: CARDIOLOGY CLINIC | Age: 68
End: 2023-09-20
Payer: MEDICARE

## 2023-09-20 VITALS
HEART RATE: 72 BPM | HEIGHT: 69 IN | BODY MASS INDEX: 33.47 KG/M2 | SYSTOLIC BLOOD PRESSURE: 122 MMHG | DIASTOLIC BLOOD PRESSURE: 84 MMHG | WEIGHT: 226 LBS | RESPIRATION RATE: 16 BRPM

## 2023-09-20 DIAGNOSIS — G89.4 CHRONIC PAIN SYNDROME: ICD-10-CM

## 2023-09-20 DIAGNOSIS — E78.2 MIXED HYPERLIPIDEMIA: ICD-10-CM

## 2023-09-20 DIAGNOSIS — I42.8 NONISCHEMIC CARDIOMYOPATHY (HCC): Primary | ICD-10-CM

## 2023-09-20 DIAGNOSIS — I50.42 CHRONIC COMBINED SYSTOLIC AND DIASTOLIC HF (HEART FAILURE) (HCC): ICD-10-CM

## 2023-09-20 DIAGNOSIS — I25.10 CORONARY ARTERY DISEASE INVOLVING NATIVE CORONARY ARTERY OF NATIVE HEART WITHOUT ANGINA PECTORIS: ICD-10-CM

## 2023-09-20 DIAGNOSIS — E66.8 MODERATE OBESITY: ICD-10-CM

## 2023-09-20 DIAGNOSIS — G47.33 OBSTRUCTIVE SLEEP APNEA: ICD-10-CM

## 2023-09-20 DIAGNOSIS — I10 PRIMARY HYPERTENSION: ICD-10-CM

## 2023-09-20 LAB
ANION GAP SERPL CALCULATED.3IONS-SCNC: 10 MMOL/L (ref 7–16)
BUN BLDV-MCNC: 21 MG/DL (ref 6–23)
CALCIUM SERPL-MCNC: 9.2 MG/DL (ref 8.6–10.2)
CHLORIDE BLD-SCNC: 105 MMOL/L (ref 98–107)
CO2: 25 MMOL/L (ref 22–29)
CREAT SERPL-MCNC: 1.1 MG/DL (ref 0.7–1.2)
GFR SERPL CREATININE-BSD FRML MDRD: >60 ML/MIN/1.73M2
GLUCOSE BLD-MCNC: 116 MG/DL (ref 74–99)
POTASSIUM SERPL-SCNC: 4.8 MMOL/L (ref 3.5–5)
PRO-BNP: 175 PG/ML (ref 0–125)
SODIUM BLD-SCNC: 140 MMOL/L (ref 132–146)

## 2023-09-20 PROCEDURE — 99215 OFFICE O/P EST HI 40 MIN: CPT | Performed by: INTERNAL MEDICINE

## 2023-09-20 PROCEDURE — 1123F ACP DISCUSS/DSCN MKR DOCD: CPT | Performed by: INTERNAL MEDICINE

## 2023-09-20 PROCEDURE — 3079F DIAST BP 80-89 MM HG: CPT | Performed by: INTERNAL MEDICINE

## 2023-09-20 PROCEDURE — 93000 ELECTROCARDIOGRAM COMPLETE: CPT | Performed by: INTERNAL MEDICINE

## 2023-09-20 PROCEDURE — 3074F SYST BP LT 130 MM HG: CPT | Performed by: INTERNAL MEDICINE

## 2023-09-20 RX ORDER — TIZANIDINE 4 MG/1
4 TABLET ORAL EVERY 12 HOURS PRN
COMMUNITY

## 2023-09-20 RX ORDER — HYDROCODONE BITARTRATE AND ACETAMINOPHEN 5; 325 MG/1; MG/1
1 TABLET ORAL DAILY PRN
COMMUNITY

## 2023-09-20 RX ORDER — CELECOXIB 200 MG/1
200 CAPSULE ORAL DAILY
COMMUNITY

## 2023-09-20 NOTE — PROGRESS NOTES
\"BILIDIR\", \"LABALBU\" in the last 72 hours. No results found for: \"MG\"  Lab Results   Component Value Date/Time    PROTIME 10.7 02/17/2022 08:00 AM    INR 1.0 02/17/2022 08:00 AM     No results found for: \"TSH\"  No components found for: \"CHLPL\"  Lab Results   Component Value Date    TRIG 72 09/22/2022    TRIG 77 02/17/2022     Lab Results   Component Value Date    HDL 34 05/12/2023    HDL 39 01/25/2023    HDL 41 09/22/2022     Lab Results   Component Value Date    LDLCALC 65 05/12/2023    LDLCALC 152 (H) 01/25/2023    LDLCALC 45 09/22/2022       Cardiac Tests:  ECG: A resting electrocardiogram demonstrates evidence of sinus rhythm with left atrial enlargement, and incomplete right bundle branch block and nonspecific ST changes      ASSESSMENT / PLAN: On a clinical basis, the patient presently appears compensated from a cardiovascular standpoint in the face of his nonischemic cardiomyopathy with normalization of left ventricular systolic function on his existing medical regimen. I have discussed this him and have not immediately altered his existing medical regimen with recommended careful monitoring of his volume status during work on the basis of his mild orthostatic blood pressure changes likely a contributing factor to his symptoms. In conjunction with his assessment, serum chemistries will be repeated to assess renal function electrolytes in addition to that of a proBNP level with potential consideration of the addition of an SGLT2 inhibitor to his medical regimen to further benefit management of his chronic heart failure. Continued appropriate lifestyle modification to achieve weight reduction will benefit diastolic cardiac performance as well as assist in management of his known obstructive sleep apnea with needs of compliance with the use of nocturnal CPAP to reduce risk of adverse cardiovascular effects.   In addition, I would recommend avoidance of the continuation of his Garza 2 agent to reduce risk of

## 2023-09-21 ENCOUNTER — TELEPHONE (OUTPATIENT)
Dept: CARDIOLOGY CLINIC | Age: 68
End: 2023-09-21

## 2023-09-21 ENCOUNTER — OFFICE VISIT (OUTPATIENT)
Dept: PRIMARY CARE CLINIC | Age: 68
End: 2023-09-21
Payer: MEDICARE

## 2023-09-21 VITALS
DIASTOLIC BLOOD PRESSURE: 68 MMHG | HEART RATE: 59 BPM | HEIGHT: 69 IN | OXYGEN SATURATION: 97 % | TEMPERATURE: 97.2 F | SYSTOLIC BLOOD PRESSURE: 106 MMHG | WEIGHT: 208.7 LBS | BODY MASS INDEX: 30.91 KG/M2

## 2023-09-21 DIAGNOSIS — G47.33 OBSTRUCTIVE SLEEP APNEA: ICD-10-CM

## 2023-09-21 DIAGNOSIS — I42.8 NONISCHEMIC CARDIOMYOPATHY (HCC): Primary | ICD-10-CM

## 2023-09-21 DIAGNOSIS — E78.2 MIXED HYPERLIPIDEMIA: ICD-10-CM

## 2023-09-21 DIAGNOSIS — R73.01 IMPAIRED FASTING GLUCOSE: ICD-10-CM

## 2023-09-21 DIAGNOSIS — L98.491 CHRONIC SKIN ULCER, LIMITED TO BREAKDOWN OF SKIN (HCC): ICD-10-CM

## 2023-09-21 PROCEDURE — 1123F ACP DISCUSS/DSCN MKR DOCD: CPT | Performed by: INTERNAL MEDICINE

## 2023-09-21 PROCEDURE — 3078F DIAST BP <80 MM HG: CPT | Performed by: INTERNAL MEDICINE

## 2023-09-21 PROCEDURE — 3074F SYST BP LT 130 MM HG: CPT | Performed by: INTERNAL MEDICINE

## 2023-09-21 PROCEDURE — 90694 VACC AIIV4 NO PRSRV 0.5ML IM: CPT | Performed by: INTERNAL MEDICINE

## 2023-09-21 PROCEDURE — 99214 OFFICE O/P EST MOD 30 MIN: CPT | Performed by: INTERNAL MEDICINE

## 2023-09-21 PROCEDURE — G0008 ADMIN INFLUENZA VIRUS VAC: HCPCS | Performed by: INTERNAL MEDICINE

## 2023-09-21 RX ORDER — LISINOPRIL 10 MG/1
10 TABLET ORAL DAILY
Qty: 180 TABLET | Refills: 1 | Status: SHIPPED
Start: 2023-09-21

## 2023-09-26 ENCOUNTER — OFFICE VISIT (OUTPATIENT)
Dept: SLEEP CENTER | Age: 68
End: 2023-09-26
Payer: MEDICARE

## 2023-09-26 VITALS
HEIGHT: 69 IN | SYSTOLIC BLOOD PRESSURE: 144 MMHG | HEART RATE: 72 BPM | BODY MASS INDEX: 33.67 KG/M2 | DIASTOLIC BLOOD PRESSURE: 97 MMHG | RESPIRATION RATE: 17 BRPM | OXYGEN SATURATION: 96 % | WEIGHT: 227.29 LBS

## 2023-09-26 DIAGNOSIS — Z78.9 INTOLERANCE OF CONTINUOUS POSITIVE AIRWAY PRESSURE (CPAP) VENTILATION: ICD-10-CM

## 2023-09-26 DIAGNOSIS — G47.33 OSA (OBSTRUCTIVE SLEEP APNEA): Primary | ICD-10-CM

## 2023-09-26 DIAGNOSIS — E66.9 CLASS 1 OBESITY WITH SERIOUS COMORBIDITY AND BODY MASS INDEX (BMI) OF 33.0 TO 33.9 IN ADULT, UNSPECIFIED OBESITY TYPE: ICD-10-CM

## 2023-09-26 PROCEDURE — 3080F DIAST BP >= 90 MM HG: CPT | Performed by: INTERNAL MEDICINE

## 2023-09-26 PROCEDURE — 3077F SYST BP >= 140 MM HG: CPT | Performed by: INTERNAL MEDICINE

## 2023-09-26 PROCEDURE — 99204 OFFICE O/P NEW MOD 45 MIN: CPT | Performed by: INTERNAL MEDICINE

## 2023-09-26 PROCEDURE — 1123F ACP DISCUSS/DSCN MKR DOCD: CPT | Performed by: INTERNAL MEDICINE

## 2023-09-26 ASSESSMENT — ENCOUNTER SYMPTOMS
GASTROINTESTINAL NEGATIVE: 1
ALLERGIC/IMMUNOLOGIC NEGATIVE: 1
RESPIRATORY NEGATIVE: 1
EYES NEGATIVE: 1

## 2023-09-26 ASSESSMENT — SLEEP AND FATIGUE QUESTIONNAIRES
HOW LIKELY ARE YOU TO NOD OFF OR FALL ASLEEP WHILE SITTING AND TALKING TO SOMEONE: 0
HOW LIKELY ARE YOU TO NOD OFF OR FALL ASLEEP WHILE LYING DOWN TO REST IN THE AFTERNOON WHEN CIRCUMSTANCES PERMIT: 1
HOW LIKELY ARE YOU TO NOD OFF OR FALL ASLEEP WHILE SITTING INACTIVE IN A PUBLIC PLACE: 1
HOW LIKELY ARE YOU TO NOD OFF OR FALL ASLEEP WHEN YOU ARE A PASSENGER IN A CAR FOR AN HOUR WITHOUT A BREAK: 2
HOW LIKELY ARE YOU TO NOD OFF OR FALL ASLEEP WHILE SITTING AND READING: 2
HOW LIKELY ARE YOU TO NOD OFF OR FALL ASLEEP WHILE SITTING QUIETLY AFTER LUNCH WITHOUT ALCOHOL: 1
ESS TOTAL SCORE: 9
HOW LIKELY ARE YOU TO NOD OFF OR FALL ASLEEP WHILE WATCHING TV: 2
HOW LIKELY ARE YOU TO NOD OFF OR FALL ASLEEP IN A CAR, WHILE STOPPED FOR A FEW MINUTES IN TRAFFIC: 0

## 2023-09-26 NOTE — PROGRESS NOTES
[address] [place] [address],[address] Sleep Medicine    Patient Name: Caitlyn Arrieta  Age: 78 y.o.   : 1955    Date of Visit: 23        HPI   Caitlyn Arrieta is a 78 y.o. male with  has a past medical history of Anxiety, Arthritis, Back pain, Chronic back pain (), Coronary artery disease involving native coronary artery of native heart without angina pectoris (2023), Depression, GERD (gastroesophageal reflux disease), HFrEF (heart failure with reduced ejection fraction) ([address]), History of blood transfusion, Hyperlipidemia, Hypertension, Nonischemic cardiomyopathy ([address]), and Sleep apnea. who presents as a new patient to Sleep Clinic, referred by Dr. Nima Pan, for Sleep Apnea (Sleep study a couple of years ago, cannot wear CPAP/Tried positional pillows and did not work)  .         2023     2:05 PM   Sleep Medicine   Sitting and reading 2   Watching TV 2   Sitting, inactive in a public place (e.g. a theatre or a meeting) 1   As a passenger in a car for an hour without a break 2   Lying down to rest in the afternoon when circumstances permit 1   Sitting and talking to someone 0   Sitting quietly after a lunch without alcohol 1   In a car, while stopped for a few minutes in traffic 0   [place] Sleepiness Score 9     Pt here for mgmt CASIE and CPAP intolerance   Due to incident as a child has claustrophobia and unable to tolerate CPAP  Was interested in hypoglossal nerve stimulator therapy but does not meet criteria    Works swing shifts, day, afternoon, or night shift, schedule changes weekly, often called in early/stays late, 12+ h shifts   Feels 5.5 hrs sleep is sufficient but often sleep disrupted due to environmental factors          PMH:  Past Medical History:   Diagnosis Date    Anxiety     Arthritis     Back pain     Chronic back pain     Coronary artery disease involving native coronary artery of native heart without angina pectoris 2023    Depression     GERD (gastroesophageal reflux disease)     HFrEF

## 2023-10-19 ENCOUNTER — PATIENT MESSAGE (OUTPATIENT)
Dept: PRIMARY CARE CLINIC | Age: 68
End: 2023-10-19

## 2023-10-19 DIAGNOSIS — E66.8 MODERATE OBESITY: Primary | ICD-10-CM

## 2023-10-20 NOTE — TELEPHONE ENCOUNTER
From: Teresita Berry  To: Dr. Eva Dickinson: 10/19/2023 9:31 PM EDT  Subject: Weight loss Dr referral     Dr Tanika Martins,  This week you gave Shyla Robles a referral to a weight loss Dr. Since I can use to lose some weight also, and the only way to help Shyla Robles succeed in this, can you send a referral to the same Dr for me so we can both go together.  Greatly appreciated, Kurt Sam

## 2023-11-11 RX ORDER — LISINOPRIL 10 MG/1
10 TABLET ORAL 2 TIMES DAILY
Qty: 180 TABLET | Refills: 1 | Status: SHIPPED | OUTPATIENT
Start: 2023-11-11

## 2023-12-04 ENCOUNTER — OFFICE VISIT (OUTPATIENT)
Dept: CARDIOLOGY CLINIC | Age: 68
End: 2023-12-04
Payer: COMMERCIAL

## 2023-12-04 VITALS
BODY MASS INDEX: 32.88 KG/M2 | RESPIRATION RATE: 16 BRPM | HEIGHT: 69 IN | DIASTOLIC BLOOD PRESSURE: 92 MMHG | HEART RATE: 61 BPM | SYSTOLIC BLOOD PRESSURE: 150 MMHG | WEIGHT: 222 LBS

## 2023-12-04 DIAGNOSIS — G47.33 OBSTRUCTIVE SLEEP APNEA: ICD-10-CM

## 2023-12-04 DIAGNOSIS — I25.10 CORONARY ARTERY DISEASE INVOLVING NATIVE CORONARY ARTERY OF NATIVE HEART WITHOUT ANGINA PECTORIS: ICD-10-CM

## 2023-12-04 DIAGNOSIS — I50.42 CHRONIC COMBINED SYSTOLIC AND DIASTOLIC HF (HEART FAILURE) (HCC): ICD-10-CM

## 2023-12-04 DIAGNOSIS — R55 NEAR SYNCOPE: ICD-10-CM

## 2023-12-04 DIAGNOSIS — E78.2 MIXED HYPERLIPIDEMIA: ICD-10-CM

## 2023-12-04 DIAGNOSIS — I42.8 NONISCHEMIC CARDIOMYOPATHY (HCC): Primary | ICD-10-CM

## 2023-12-04 DIAGNOSIS — E66.8 MODERATE OBESITY: ICD-10-CM

## 2023-12-04 DIAGNOSIS — I10 PRIMARY HYPERTENSION: ICD-10-CM

## 2023-12-04 PROCEDURE — 99215 OFFICE O/P EST HI 40 MIN: CPT | Performed by: INTERNAL MEDICINE

## 2023-12-04 PROCEDURE — 3080F DIAST BP >= 90 MM HG: CPT | Performed by: INTERNAL MEDICINE

## 2023-12-04 PROCEDURE — 1123F ACP DISCUSS/DSCN MKR DOCD: CPT | Performed by: INTERNAL MEDICINE

## 2023-12-04 PROCEDURE — 93000 ELECTROCARDIOGRAM COMPLETE: CPT | Performed by: INTERNAL MEDICINE

## 2023-12-04 PROCEDURE — 3077F SYST BP >= 140 MM HG: CPT | Performed by: INTERNAL MEDICINE

## 2024-01-08 ENCOUNTER — TELEPHONE (OUTPATIENT)
Dept: CARDIOLOGY CLINIC | Age: 69
End: 2024-01-08

## 2024-01-08 NOTE — TELEPHONE ENCOUNTER
Called to give patient Zio monitor results. Patient verbalized understanding and all questions were answered.

## 2024-01-13 ENCOUNTER — HOSPITAL ENCOUNTER (OUTPATIENT)
Age: 69
Discharge: HOME | End: 2024-01-13
Payer: COMMERCIAL

## 2024-01-13 DIAGNOSIS — E78.2 MIXED HYPERLIPIDEMIA: ICD-10-CM

## 2024-01-13 DIAGNOSIS — R73.01 IMPAIRED FASTING GLUCOSE: ICD-10-CM

## 2024-01-13 DIAGNOSIS — I10 PRIMARY HYPERTENSION: ICD-10-CM

## 2024-01-13 LAB
ALBUMIN SERPL-MCNC: 4.2 G/DL (ref 3.5–5.2)
ALP SERPL-CCNC: 70 U/L (ref 40–129)
ALT SERPL-CCNC: 38 U/L (ref 0–40)
ANION GAP SERPL CALCULATED.3IONS-SCNC: 10 MMOL/L (ref 7–16)
AST SERPL-CCNC: 26 U/L (ref 0–39)
BILIRUB SERPL-MCNC: 0.7 MG/DL (ref 0–1.2)
BUN SERPL-MCNC: 16 MG/DL (ref 6–23)
CALCIUM SERPL-MCNC: 8.9 MG/DL (ref 8.6–10.2)
CHLORIDE SERPL-SCNC: 108 MMOL/L (ref 98–107)
CHOLEST SERPL-MCNC: 92 MG/DL
CO2 SERPL-SCNC: 25 MMOL/L (ref 22–29)
CREAT SERPL-MCNC: 1.1 MG/DL (ref 0.7–1.2)
ERYTHROCYTE [DISTWIDTH] IN BLOOD BY AUTOMATED COUNT: 12.5 % (ref 11.5–15)
GFR SERPL CREATININE-BSD FRML MDRD: >60 ML/MIN/1.73M2
GLUCOSE SERPL-MCNC: 103 MG/DL (ref 74–99)
HBA1C MFR BLD: 6.2 % (ref 4–5.6)
HCT VFR BLD AUTO: 44.5 % (ref 37–54)
HDLC SERPL-MCNC: 37 MG/DL
HGB BLD-MCNC: 14.6 G/DL (ref 12.5–16.5)
LDLC SERPL CALC-MCNC: 43 MG/DL
MCH RBC QN AUTO: 29 PG (ref 26–35)
MCHC RBC AUTO-ENTMCNC: 32.8 G/DL (ref 32–34.5)
MCV RBC AUTO: 88.3 FL (ref 80–99.9)
PLATELET # BLD AUTO: 176 K/UL (ref 130–450)
PMV BLD AUTO: 9.7 FL (ref 7–12)
POTASSIUM SERPL-SCNC: 4.4 MMOL/L (ref 3.5–5)
PROT SERPL-MCNC: 6.6 G/DL (ref 6.4–8.3)
RBC # BLD AUTO: 5.04 M/UL (ref 3.8–5.8)
SODIUM SERPL-SCNC: 143 MMOL/L (ref 132–146)
TRIGL SERPL-MCNC: 61 MG/DL
VLDLC SERPL CALC-MCNC: 12 MG/DL
WBC OTHER # BLD: 6 K/UL (ref 4.5–11.5)

## 2024-01-13 PROCEDURE — 85027 COMPLETE CBC AUTOMATED: CPT

## 2024-01-13 PROCEDURE — 83036 HEMOGLOBIN GLYCOSYLATED A1C: CPT

## 2024-01-13 PROCEDURE — 36415 COLL VENOUS BLD VENIPUNCTURE: CPT

## 2024-01-13 PROCEDURE — 80061 LIPID PANEL: CPT

## 2024-01-13 PROCEDURE — 80053 COMPREHEN METABOLIC PANEL: CPT

## 2024-01-14 LAB
BASOPHILS # BLD: 0.02 K/UL (ref 0–0.2)
BASOPHILS NFR BLD: 0 % (ref 0–2)
EOSINOPHIL # BLD: 0.12 K/UL (ref 0.05–0.5)
EOSINOPHILS RELATIVE PERCENT: 2 % (ref 0–6)
IMM GRANULOCYTES # BLD AUTO: <0.03 K/UL (ref 0–0.58)
IMM GRANULOCYTES NFR BLD: 0 % (ref 0–5)
LYMPHOCYTES NFR BLD: 1.79 K/UL (ref 1.5–4)
LYMPHOCYTES RELATIVE PERCENT: 30 % (ref 20–42)
MONOCYTES NFR BLD: 0.43 K/UL (ref 0.1–0.95)
MONOCYTES NFR BLD: 7 % (ref 2–12)
NEUTROPHILS NFR BLD: 60 % (ref 43–80)
NEUTS SEG NFR BLD: 3.51 K/UL (ref 1.8–7.3)

## 2024-01-16 ENCOUNTER — OFFICE VISIT (OUTPATIENT)
Dept: PRIMARY CARE CLINIC | Age: 69
End: 2024-01-16
Payer: COMMERCIAL

## 2024-01-16 VITALS
DIASTOLIC BLOOD PRESSURE: 80 MMHG | TEMPERATURE: 97.6 F | BODY MASS INDEX: 33.62 KG/M2 | SYSTOLIC BLOOD PRESSURE: 138 MMHG | OXYGEN SATURATION: 95 % | HEART RATE: 83 BPM | HEIGHT: 69 IN | WEIGHT: 227 LBS

## 2024-01-16 DIAGNOSIS — E78.2 MIXED HYPERLIPIDEMIA: ICD-10-CM

## 2024-01-16 DIAGNOSIS — Z11.59 NEED FOR HEPATITIS C SCREENING TEST: Primary | ICD-10-CM

## 2024-01-16 DIAGNOSIS — I50.20 HFREF (HEART FAILURE WITH REDUCED EJECTION FRACTION) (HCC): ICD-10-CM

## 2024-01-16 PROCEDURE — 3079F DIAST BP 80-89 MM HG: CPT | Performed by: INTERNAL MEDICINE

## 2024-01-16 PROCEDURE — 99213 OFFICE O/P EST LOW 20 MIN: CPT | Performed by: INTERNAL MEDICINE

## 2024-01-16 PROCEDURE — 3075F SYST BP GE 130 - 139MM HG: CPT | Performed by: INTERNAL MEDICINE

## 2024-01-16 PROCEDURE — 1123F ACP DISCUSS/DSCN MKR DOCD: CPT | Performed by: INTERNAL MEDICINE

## 2024-01-16 RX ORDER — METOPROLOL SUCCINATE 25 MG/1
12.5 TABLET, EXTENDED RELEASE ORAL EVERY EVENING
Qty: 45 TABLET | Refills: 1 | Status: SHIPPED | OUTPATIENT
Start: 2024-01-16

## 2024-01-16 RX ORDER — ROSUVASTATIN CALCIUM 20 MG/1
20 TABLET, COATED ORAL NIGHTLY
Qty: 90 TABLET | Refills: 1 | Status: SHIPPED | OUTPATIENT
Start: 2024-01-16

## 2024-01-16 ASSESSMENT — ENCOUNTER SYMPTOMS
SINUS PAIN: 0
FACIAL SWELLING: 0
EYE PAIN: 0
EYE DISCHARGE: 0
ALLERGIC/IMMUNOLOGIC NEGATIVE: 1
RESPIRATORY NEGATIVE: 1
GASTROINTESTINAL NEGATIVE: 1
EYE REDNESS: 0
EYE ITCHING: 0
EYES NEGATIVE: 1
RHINORRHEA: 0

## 2024-01-16 ASSESSMENT — PATIENT HEALTH QUESTIONNAIRE - PHQ9
SUM OF ALL RESPONSES TO PHQ QUESTIONS 1-9: 1
SUM OF ALL RESPONSES TO PHQ9 QUESTIONS 1 & 2: 1
SUM OF ALL RESPONSES TO PHQ QUESTIONS 1-9: 1
SUM OF ALL RESPONSES TO PHQ QUESTIONS 1-9: 1
1. LITTLE INTEREST OR PLEASURE IN DOING THINGS: 1
SUM OF ALL RESPONSES TO PHQ9 QUESTIONS 1 & 2: 1
SUM OF ALL RESPONSES TO PHQ QUESTIONS 1-9: 1
1. LITTLE INTEREST OR PLEASURE IN DOING THINGS: SEVERAL DAYS
2. FEELING DOWN, DEPRESSED OR HOPELESS: NOT AT ALL
2. FEELING DOWN, DEPRESSED OR HOPELESS: 0

## 2024-01-16 NOTE — PROGRESS NOTES
Mental Status: He is alert and oriented to person, place, and time.      Sensory: No sensory deficit.      Motor: No weakness.      Gait: Gait normal.   Psychiatric:         Mood and Affect: Mood normal.         Behavior: Behavior normal.         Thought Content: Thought content normal.         Judgment: Judgment normal.               ASSESSMENT/PLAN:  1. Need for hepatitis C screening test  -     Hepatitis C Antibody; Future  2. HFrEF (heart failure with reduced ejection fraction) (HCC)  -     metoprolol succinate (TOPROL XL) 25 MG extended release tablet; Take 0.5 tablets by mouth every evening, Disp-45 tablet, R-1Normal  3. Mixed hyperlipidemia  -     rosuvastatin (CRESTOR) 20 MG tablet; Take 1 tablet by mouth nightly, Disp-90 tablet, R-1Normal  HTN same meds take the second dose of lisinopril only if BP systolic more [id number]    No follow-ups on file.       An electronic signature was used to authenticate this note.    --Tawanna Lawson MD

## 2024-02-05 ENCOUNTER — OFFICE VISIT (OUTPATIENT)
Dept: BARIATRICS/WEIGHT MGMT | Age: 69
End: 2024-02-05
Payer: COMMERCIAL

## 2024-02-05 VITALS
HEART RATE: 59 BPM | BODY MASS INDEX: 34.1 KG/M2 | HEIGHT: 68 IN | SYSTOLIC BLOOD PRESSURE: 111 MMHG | WEIGHT: 225 LBS | DIASTOLIC BLOOD PRESSURE: 73 MMHG | TEMPERATURE: 97 F

## 2024-02-05 DIAGNOSIS — E66.9 OBESITY, CLASS I, BMI 30-34.9: Primary | ICD-10-CM

## 2024-02-05 PROCEDURE — 3074F SYST BP LT 130 MM HG: CPT | Performed by: CLINICAL NURSE SPECIALIST

## 2024-02-05 PROCEDURE — 3078F DIAST BP <80 MM HG: CPT | Performed by: CLINICAL NURSE SPECIALIST

## 2024-02-05 PROCEDURE — 99202 OFFICE O/P NEW SF 15 MIN: CPT

## 2024-02-05 PROCEDURE — 99205 OFFICE O/P NEW HI 60 MIN: CPT | Performed by: CLINICAL NURSE SPECIALIST

## 2024-02-05 PROCEDURE — 1123F ACP DISCUSS/DSCN MKR DOCD: CPT | Performed by: CLINICAL NURSE SPECIALIST

## 2024-02-05 RX ORDER — BUPROPION HYDROCHLORIDE 150 MG/1
150 TABLET ORAL EVERY MORNING
Qty: 30 TABLET | Refills: 3 | Status: SHIPPED | OUTPATIENT
Start: 2024-02-05

## 2024-02-05 ASSESSMENT — ENCOUNTER SYMPTOMS
BACK PAIN: 1
DIARRHEA: 1
SHORTNESS OF BREATH: 1
CONSTIPATION: 1
NAUSEA: 1
VOMITING: 0
COUGH: 0

## 2024-02-05 NOTE — PROGRESS NOTES
CC -   HTN, Obesity    BACKGROUND -   First visit: 02/05/2024    Obesity   Began in few years  Initial BMI 34.46, Wt 225 lbs Ht 5' 9\"  HS Grad wt 170 lbs   Lowest   wt 170 lbs   Highest  wt 235 lbs  Pattern of wt gain: gradual   Wt change past yr:  no change  Most wt lost: 5-10lbs cut out bread  Other diets attempted: Cut out bread    Desire to lose weight: 6/10  Problem posed by appetite: 6-7/10    Initial Diet:    Number of meals per day - 1-2    Number of snacks per day - 4-6    Meal volume -  \"  no plate  sometimes seconds    Fast food/convenience store - 3-4 x/week    Restaurants (not fast food) - 1-2x/week   Sweets - 7 d/week werther originals 10-12 pieces. Candy bars   Chips - 7 d/week BBQ salt & vinegar  1.2 oz   Crackers/pretzels - 1d/week Ritz 10 crackers    Nuts - 7 d/week 1 cup pistachios 3 # bag in 1 week    Peanut Butter - 0 d/week   Popcorn - 1-2 d/week Orvile Redinbacher  theater butter    Dried fruit - 2 d/week  dried apples & bananas   Whole fruit - 3 d/week clementines, grapes   Breakfast cereal - 7 d/week cocoa puffs  petty charms whole milk 1.cup   Granola/Protein/Energy bar - 0 d/week   Sugar sweetened beverages - G2 Sweet tea  gold peak 3-4 bottle x 7 days    Protein - No supplements   Fiber - No supplements   Multivitamin -none    Exercise:    Gym membership - no    Walking -  [id number] steps at work 40- 72 hours at work)    Running - no    Resistance - no    Aerobic class - no    ______________________    [facility] -  Past Medical History:   Diagnosis Date    Anxiety     Arthritis     Back pain     CHF (congestive heart failure) ([facility])     Chronic back pain 8/85    Congenital heart disease     Coronary artery disease involving native coronary artery of native heart without angina pectoris 08/22/2023    Depression     GERD (gastroesophageal reflux disease)     HFrEF (heart failure with reduced ejection fraction) ([facility])     History of blood transfusion     Hyperlipidemia     Hypertension

## 2024-02-05 NOTE — PATIENT INSTRUCTIONS
Patient Instructions:    Take Bupropion XL (Wellbutrin XL) 150 mg, one tablet every morning.  While taking it, check the BP twice each day for one week. If the systolic BP is >155 mmHg or the diastolic BP is >90 mmHg consistently, then stop taking it.     Requirements:  Make sure protein intake is at least 65 grams per day (do not count protein every day; instead spot check your intake every 2-3 weeks and make sure what you think you are getting is close to accurate; consider using a protein shake if needed; these are in the pharmacy section of the stores, not the grocery section; Premier, Pure Protein and Fairlife are relatively inexpensive and taste good to most patients; other options are Nectar, Boost Max, Ensure Max, BeneProtein and GNC lean (which is lactose-free);   Nectar fruit, Premier Protein Clear, IsoPure Protein Drink, and Protein 2 O are water-based options; Quest (or Cosco, which is cheaper and is ordered on Amazon) and the FaceOn Mobile protein bars can also be used, but have less protein in them )    (Disclaimer: Dietary supplements rarely have their listed ingredients and the amount of each verified by a third party other. Sometimes they give verification for their claims to be GMO and gluten free and to be organic. However, even such verifications as these may still be untrustworthy.)    Make sure that fiber intake is at least 22 grams per day. Do this by either eating 12 tablespoons of the original, plain Fiber One cereal every day or 4 tablespoons of wheat dextrin powder (Benefiber or a generic brand) every day. Work up to this amount slowly by starting with only one-eighth to one-fourth of the target amount and then adding another one-eighth to one-fourth every one or two weeks until reaching the target.  Take one multivitamin every day    Targets:  Limit calorie intake to 1800 calories/day  Walk [id number] step s 4-5 days a week  Avoid eating 2 hours within bedtime.   Limit sweets to one day per

## 2024-02-06 ENCOUNTER — OFFICE VISIT (OUTPATIENT)
Dept: DERMATOLOGY | Facility: CLINIC | Age: 69
End: 2024-02-06
Payer: COMMERCIAL

## 2024-02-06 DIAGNOSIS — C44.329 SQUAMOUS CELL CANCER OF SKIN OF EYEBROW: Primary | ICD-10-CM

## 2024-02-06 DIAGNOSIS — C44.319 BASAL CELL CARCINOMA (BCC) OF EYEBROW: ICD-10-CM

## 2024-02-06 PROCEDURE — 17311 MOHS 1 STAGE H/N/HF/G: CPT | Performed by: DERMATOLOGY

## 2024-02-06 PROCEDURE — 17312 MOHS ADDL STAGE: CPT | Performed by: DERMATOLOGY

## 2024-02-06 PROCEDURE — 99204 OFFICE O/P NEW MOD 45 MIN: CPT | Performed by: DERMATOLOGY

## 2024-02-06 PROCEDURE — 13132 CMPLX RPR F/C/C/M/N/AX/G/H/F: CPT | Performed by: DERMATOLOGY

## 2024-02-06 PROCEDURE — 1159F MED LIST DOCD IN RCRD: CPT | Performed by: DERMATOLOGY

## 2024-02-06 RX ORDER — LISINOPRIL 10 MG/1
1 TABLET ORAL
COMMUNITY
Start: 2023-11-11

## 2024-02-06 RX ORDER — ROSUVASTATIN CALCIUM 20 MG/1
1 TABLET, COATED ORAL NIGHTLY
COMMUNITY
Start: 2024-01-16

## 2024-02-06 RX ORDER — HYDROCODONE BITARTRATE AND ACETAMINOPHEN 5; 325 MG/1; MG/1
1 TABLET ORAL DAILY PRN
COMMUNITY

## 2024-02-06 RX ORDER — MELOXICAM 15 MG/1
TABLET ORAL
COMMUNITY
Start: 2023-07-17

## 2024-02-06 RX ORDER — HYDROCHLOROTHIAZIDE 12.5 MG/1
12.5 CAPSULE ORAL
COMMUNITY
Start: 2023-07-04

## 2024-02-06 RX ORDER — METOPROLOL SUCCINATE 25 MG/1
12.5 TABLET, EXTENDED RELEASE ORAL
COMMUNITY
Start: 2024-01-16

## 2024-02-06 RX ORDER — TIZANIDINE 4 MG/1
1 TABLET ORAL EVERY 12 HOURS PRN
COMMUNITY

## 2024-02-06 RX ORDER — ASPIRIN 81 MG/1
1 TABLET ORAL DAILY
COMMUNITY

## 2024-02-06 NOTE — PROGRESS NOTES
Mohs Surgery Operative Note    Date of Surgery:  2/6/2024  Surgeon:  Casandra Dillon MD  Office Location:  [address] [facility]  [address] [facility]  [address]  [facility] [address]  Dept: [phone]  Dept Fax: [phone]  Referring Provider: Aurora Frias MD  [address] [place] [address]  Fax: [phone]    Assessment/Plan   Pre-procedure:   Obtained informed consent: written from patient  The surgical site was identified and confirmed with the patient.     Intra-operative:   Audible time out called at : 10:00 AM 02/06/24  by: Jennifer Mirza LPN   Verified patient name, birthdate, site, specimen bottle label & requisition.    The planned procedure(s) was again reviewed with the patient. The risks of bleeding, infection, nerve damage and scarring were reviewed. Written authorization was obtained. The patient identity, surgical site, and planned procedure(s) were verified. The provider acted as both surgeon and pathologist.     Squamous cell carcinoma of skin  Left Eyebrow  Mohs surgery  Consent obtained: written    Universal Protocol:  Procedure explained and questions answered to patient or proxy's satisfaction: Yes    Test results available and properly labeled: Yes    Pathology report reviewed: Yes    External notes reviewed: Yes    Photo or diagram used for site identification: Yes    Site/side marked: Yes    Slide independently reviewed by Mohs surgeon: Yes    Immediately prior to procedure a time out was called: Yes    Patient identity confirmed: verbally with patient  Preparation: Patient was prepped and draped in usual sterile fashion      Anticoagulation:  Was the anticoagulation regimen changed prior to Mohs? No      Anesthesia:  Anesthesia method: local infiltration  Local anesthetic: lidocaine 2% WITH epi    Procedure Details:  Biopsy accession number: [id number]  Date of biopsy: 1/22/2024  Pre-Op diagnosis: squamous cell carcinoma  Post-Op Diagnosis: same as  pre-op diagnosis and basal cell carcinoma  Surgery side: left  Surgical site (from skin exam): Left Eyebrow  Pre-operative length (cm): 2  Pre-operative width (cm): 1  Indications for Mohs surgery: anatomic location where tissue conservation is critical    Micrographic Surgery Details:  Post-operative length (cm): 2.8  Post-operative width (cm): 1.5  Number of Mohs stages: 3    Stage 1     Comments: The patient was brought into the operating room and placed in the procedure chair in the appropriate position.  The area positive by previous biopsy was identified and confirmed with the patient. The area of clinically obvious tumor was debulked using a curette and/or scalpel as needed. An incision was made following the Mohs approach through the skin. The specimen was taken to the lab, divided into 2 piece(s) and appropriately chromacoded and processed.  Tumor was seen on both the lateral and deep margins as indicated on the on the Mohs map.  Infiltrative basal cell carcinoma. Histologic examination revealed thin strands and small-angulated aggregates of atypical basaloid cells.  Tumor features identified on Mohs section: basal carcinoma   Depth of invasion: dermis    Stage 2     Comments: The area of positivity as noted on the Mohs map in the previous stage was identified and removed using the Mohs technique. The specimen was taken to the lab and appropriately chromacoded and processed in 1 piece(s).  Tumor was seen on both the lateral and deep margins as indicated on the on the Mohs map.  Nodular basal cell carcinoma. Histologic examination revealed large tumor aggregates of atypical basaloid cells with peripheral palisading and tumoral clefting.   Tumor features identified on Mohs section: basal carcinoma  Depth of invasion: dermis    Stage 3     Comments: The area of positivity as noted on the Mohs map in the previous stage was identified and removed using the Mohs technique. The specimen was taken to the lab and  appropriately chromacoded and processed in 1 piece(s).  Tumor features identified on Mohs section: no tumor identified  Depth of defect:skeletal muscle    Patient tolerance of procedure: tolerated well, no immediate complications    Reconstruction:  Was the defect reconstructed? Yes    Was reconstruction performed by the same Mohs surgeon? Yes    Setting of reconstruction: outpatient office  When was reconstruction performed? same day  Type of reconstruction: linear  Linear reconstruction: complex  Length of linear repair (cm): 4  Subcutaneous Layers (Deep Stitches)   Suture size:  5-0  Suture type:  Vicryl  Stitches:  Buried vertical mattress  Fine/surface layer approximation (top stitches)   Epidermal/Superficial suture size:  5-0  Epidermal/Superficial suture type:  Fast-absorbing gut  Stitches: simple running    Hemostasis achieved with: suture, pressure and electrodesiccation  Outcome: patient tolerated procedure well with no complications    Post-procedure details: wound care instructions given      Complex Linear Repair - Wide Undermining:  Given the location and size of the defect, it was determined that a complex layered linear closure was required to restore normal anatomy and function. The repair was considered complex because extensive undermining was required and performed. The amount of undermining performed was greater than the maximum width of the defect as measured perpendicular to the closure line along at least one entire edge of the defect. Standing cutaneous cones were removed using Burow's triangles. The wound edges were brought into close approximation with buried vertical mattress sutures. The remainder of the wound was then closed with epidermal top sutures.    The final repair measured 4.0 cm          Wound care was discussed, and the patient was given written post-operative wound care instructions.      The patient will follow up with Casandra Dillon MD as needed for any post operative  problems or concerns, and will follow up with their primary dermatologist as scheduled.

## 2024-02-06 NOTE — LETTER
February 9, 2024     Aurora Frias MD  [address] [facility].  [address]  [place] [address]    Patient: Terrence Riddle   YOB: 1955   Date of Visit: 2/6/2024       Dear Dr. Aurora Frias MD:    Thank you for referring Terrence Riddle to me for evaluation. Below are my notes for this consultation.  If you have questions, please do not hesitate to call me. I look forward to following your patient along with you.       Sincerely,     Casandra Dillon MD      CC: No Recipients  ______________________________________________________________________________________    Mohs Surgery Operative Note    Date of Surgery:  2/6/2024  Surgeon:  Casandra Dillon MD  Office Location: [address]  [address] [facility] [address]  [facility] [address]  Dept: [phone]  Dept Fax: [phone]  Referring Provider: Aurora Frias MD  [facility] [place] [address]  Fax: [phone]    Assessment/Plan  Pre-procedure:   Obtained informed consent: written from patient  The surgical site was identified and confirmed with the patient.     Intra-operative:   Audible time out called at : 10:00 AM 02/06/24  by: Jennifer Mirza LPN   Verified patient name, birthdate, site, specimen bottle label & requisition.    The planned procedure(s) was again reviewed with the patient. The risks of bleeding, infection, nerve damage and scarring were reviewed. Written authorization was obtained. The patient identity, surgical site, and planned procedure(s) were verified. The provider acted as both surgeon and pathologist.     Squamous cell carcinoma of skin  Left Eyebrow  Mohs surgery  Consent obtained: written    Universal Protocol:  Procedure explained and questions answered to patient or proxy's satisfaction: Yes    Test results available and properly labeled: Yes    Pathology report reviewed: Yes    External notes reviewed: Yes    Photo or diagram used for site identification: Yes     Site/side marked: Yes    Slide independently reviewed by Mohs surgeon: Yes    Immediately prior to procedure a time out was called: Yes    Patient identity confirmed: verbally with patient  Preparation: Patient was prepped and draped in usual sterile fashion      Anticoagulation:  Was the anticoagulation regimen changed prior to Mohs? No      Anesthesia:  Anesthesia method: local infiltration  Local anesthetic: lidocaine 2% WITH epi    Procedure Details:  Biopsy accession number: [id number]  Date of biopsy: 1/22/2024  Pre-Op diagnosis: squamous cell carcinoma  Post-Op Diagnosis: same as pre-op diagnosis and basal cell carcinoma  Surgery side: left  Surgical site (from skin exam): Left Eyebrow  Pre-operative length (cm): 2  Pre-operative width (cm): 1  Indications for Mohs surgery: anatomic location where tissue conservation is critical    Micrographic Surgery Details:  Post-operative length (cm): 2.8  Post-operative width (cm): 1.5  Number of Mohs stages: 3    Stage 1     Comments: The patient was brought into the operating room and placed in the procedure chair in the appropriate position.  The area positive by previous biopsy was identified and confirmed with the patient. The area of clinically obvious tumor was debulked using a curette and/or scalpel as needed. An incision was made following the Mohs approach through the skin. The specimen was taken to the lab, divided into 2 piece(s) and appropriately chromacoded and processed.  Tumor was seen on both the lateral and deep margins as indicated on the on the Mohs map.  Infiltrative basal cell carcinoma. Histologic examination revealed thin strands and small-angulated aggregates of atypical basaloid cells.  Tumor features identified on Mohs section: basal carcinoma   Depth of invasion: dermis    Stage 2     Comments: The area of positivity as noted on the Mohs map in the previous stage was identified and removed using the Mohs technique. The specimen was taken to  the lab and appropriately chromacoded and processed in 1 piece(s).  Tumor was seen on both the lateral and deep margins as indicated on the on the Mohs map.  Nodular basal cell carcinoma. Histologic examination revealed large tumor aggregates of atypical basaloid cells with peripheral palisading and tumoral clefting.   Tumor features identified on Mohs section: basal carcinoma  Depth of invasion: dermis    Stage 3     Comments: The area of positivity as noted on the Mohs map in the previous stage was identified and removed using the Mohs technique. The specimen was taken to the lab and appropriately chromacoded and processed in 1 piece(s).  Tumor features identified on Mohs section: no tumor identified  Depth of defect:skeletal muscle    Patient tolerance of procedure: tolerated well, no immediate complications    Reconstruction:  Was the defect reconstructed? Yes    Was reconstruction performed by the same Mohs surgeon? Yes    Setting of reconstruction: outpatient office  When was reconstruction performed? same day  Type of reconstruction: linear  Linear reconstruction: complex  Length of linear repair (cm): 4  Subcutaneous Layers (Deep Stitches)   Suture size:  5-0  Suture type:  Vicryl  Stitches:  Buried vertical mattress  Fine/surface layer approximation (top stitches)   Epidermal/Superficial suture size:  5-0  Epidermal/Superficial suture type:  Fast-absorbing gut  Stitches: simple running    Hemostasis achieved with: suture, pressure and electrodesiccation  Outcome: patient tolerated procedure well with no complications    Post-procedure details: wound care instructions given      Complex Linear Repair - Wide Undermining:  Given the location and size of the defect, it was determined that a complex layered linear closure was required to restore normal anatomy and function. The repair was considered complex because extensive undermining was required and performed. The amount of undermining performed was greater  "than the maximum width of the defect as measured perpendicular to the closure line along at least one entire edge of the defect. Standing cutaneous cones were removed using Burow's triangles. The wound edges were brought into close approximation with buried vertical mattress sutures. The remainder of the wound was then closed with epidermal top sutures.    The final repair measured 4.0 cm          Wound care was discussed, and the patient was given written post-operative wound care instructions.      The patient will follow up with Casandra Dillon MD as needed for any post operative problems or concerns, and will follow up with their primary dermatologist as scheduled.        Office Visit Note  Date: 2/6/2024  Surgeon:  Casandra Dillon MD  Office Location:  [address] [facility]  [address] [facility] [address]  [facility] [address]  Dept: [phone]  Dept Fax: [phone]  Referring Provider: Aurora Frias MD  [facility] [place] [address]  Fax: [phone]    Subjective  Willy Riddle \"Terrence\" is a 68 y.o. male who presents for the following: MOHS Surgery    According to the patient, the lesion has been present for approximately 6 months at the time of diagnosis.  The lesion is not causing symptoms.  The lesion has not been treated previously.    The patient does not have a pacemaker / defibrillator.  The patient does have a heart valve / joint replacement.    The patient is on blood thinners.  The patient does not have a history of hepatitis B or C.  The patient does not have a history of HIV.  The patient does not have a history of immunosuppression (e.g. organ transplantation, malignancy, medications)    Review of Systems:  No other skin or systemic complaints other than what is documented elsewhere in the note.    MEDICAL HISTORY: clinically relevant history including significant past medical history, medications and allergies was reviewed and documented in " Epic.    Objective  Well appearing patient in no apparent distress; mood and affect are within normal limits.  Vital signs: See record.  Noted on the Left Eyebrow  Is a 2.0 x 1.0 cm scar    The patient confirmed the identified site.    Discussion:  The nature of the diagnosis was explained. The lesion is a skin cancer.  It has a risk of local growth and distant spread. The condition is associated with sun exposure.  Warning signs of non-melanoma skin cancer discussed. Patient was instructed to perform monthly self skin examination.  We recommended that the patient have regular full skin exams given an increased risk of subsequent skin cancers. The patient was instructed to use sun protective behaviors including use of broad spectrum sunscreens and sun protective clothing to reduce risk of skin cancers.      Risks, benefits, side effects of Mohs surgery were discussed with patient and the patient voiced understanding.  It was explained that even though the cure rate of Mohs is very high it is not 100%. Risks of surgery including but not limited to bleeding, infection, numbness, nerve damage, and scar were reviewed.  Discussion included wound care requirements, activity restrictions, likely scar outcome and time to heal.     After Mohs surgery, the defect may need to be repaired surgically and the scar may be longer than the original lesion.  Reconstruction options, risks, and benefits were reviewed including second intention healing, linear repair (4-1 ratio was explained), local flaps, skin grafts, cartilage grafts and interpolation flaps (the need for multiple surgeries was explained). Possible outcomes were reviewed including likely scar appearance, failure of flap survival, infection, bleeding and the need for revision surgery.     The pathology was reviewed, the photograph was reviewed, and the referring physician's note was reviewed.    Patient elected for Mohs surgery.

## 2024-02-06 NOTE — PROGRESS NOTES
"Office Visit Note  Date: 2/6/2024  Surgeon:  Casandra Dillon MD  Office Location: DO [address] [facility]  [address]  [address]  [facility] [address]  Dept: [phone]  Dept Fax: [phone]  Referring Provider: Aurora Frias MD  [address] [place] [address]  Fax: [phone]    Subjective   Willy Riddle \"Terrence\" is a 68 y.o. male who presents for the following: MOHS Surgery    According to the patient, the lesion has been present for approximately 6 months at the time of diagnosis.  The lesion is not causing symptoms.  The lesion has not been treated previously.    The patient does not have a pacemaker / defibrillator.  The patient does have a heart valve / joint replacement.    The patient is on blood thinners.  The patient does not have a history of hepatitis B or C.  The patient does not have a history of HIV.  The patient does not have a history of immunosuppression (e.g. organ transplantation, malignancy, medications)    Review of Systems:  No other skin or systemic complaints other than what is documented elsewhere in the note.    MEDICAL HISTORY: clinically relevant history including significant past medical history, medications and allergies was reviewed and documented in Epic.    Objective   Well appearing patient in no apparent distress; mood and affect are within normal limits.  Vital signs: See record.  Noted on the Left Eyebrow  Is a 2.0 x 1.0 cm scar    The patient confirmed the identified site.    Discussion:  The nature of the diagnosis was explained. The lesion is a skin cancer.  It has a risk of local growth and distant spread. The condition is associated with sun exposure.  Warning signs of non-melanoma skin cancer discussed. Patient was instructed to perform monthly self skin examination.  We recommended that the patient have regular full skin exams given an increased risk of subsequent skin cancers. The patient was instructed to use sun " protective behaviors including use of broad spectrum sunscreens and sun protective clothing to reduce risk of skin cancers.      Risks, benefits, side effects of Mohs surgery were discussed with patient and the patient voiced understanding.  It was explained that even though the cure rate of Mohs is very high it is not 100%. Risks of surgery including but not limited to bleeding, infection, numbness, nerve damage, and scar were reviewed.  Discussion included wound care requirements, activity restrictions, likely scar outcome and time to heal.     After Mohs surgery, the defect may need to be repaired surgically and the scar may be longer than the original lesion.  Reconstruction options, risks, and benefits were reviewed including second intention healing, linear repair (4-1 ratio was explained), local flaps, skin grafts, cartilage grafts and interpolation flaps (the need for multiple surgeries was explained). Possible outcomes were reviewed including likely scar appearance, failure of flap survival, infection, bleeding and the need for revision surgery.     The pathology was reviewed, the photograph was reviewed, and the referring physician's note was reviewed.    Patient elected for Mohs surgery.

## 2024-03-14 ENCOUNTER — OFFICE VISIT (OUTPATIENT)
Dept: BARIATRICS/WEIGHT MGMT | Age: 69
End: 2024-03-14
Payer: COMMERCIAL

## 2024-03-14 VITALS
HEIGHT: 68 IN | DIASTOLIC BLOOD PRESSURE: 91 MMHG | HEART RATE: 52 BPM | BODY MASS INDEX: 33.01 KG/M2 | SYSTOLIC BLOOD PRESSURE: 150 MMHG | WEIGHT: 217.8 LBS | TEMPERATURE: 96.8 F

## 2024-03-14 DIAGNOSIS — E66.9 OBESITY, CLASS I, BMI 30-34.9: Primary | ICD-10-CM

## 2024-03-14 DIAGNOSIS — I10 ESSENTIAL HYPERTENSION: ICD-10-CM

## 2024-03-14 PROCEDURE — 99211 OFF/OP EST MAY X REQ PHY/QHP: CPT

## 2024-03-14 PROCEDURE — 3077F SYST BP >= 140 MM HG: CPT | Performed by: CLINICAL NURSE SPECIALIST

## 2024-03-14 PROCEDURE — 99213 OFFICE O/P EST LOW 20 MIN: CPT | Performed by: CLINICAL NURSE SPECIALIST

## 2024-03-14 PROCEDURE — 1123F ACP DISCUSS/DSCN MKR DOCD: CPT | Performed by: CLINICAL NURSE SPECIALIST

## 2024-03-14 PROCEDURE — 3080F DIAST BP >= 90 MM HG: CPT | Performed by: CLINICAL NURSE SPECIALIST

## 2024-03-14 ASSESSMENT — ENCOUNTER SYMPTOMS
CONSTIPATION: 0
NAUSEA: 0
SHORTNESS OF BREATH: 0
VOMITING: 0

## 2024-03-14 NOTE — PROGRESS NOTES
CC -   HTN, Obesity    BACKGROUND -   Last visit: 02/05/2024  First visit: 02/05/2024    Obesity   Began in few years  Initial BMI 34.46, Wt 225 lbs Ht 5' 9\"  HS Grad wt 170 lbs   Lowest   wt 170 lbs   Highest  wt 235 lbs  Pattern of wt gain: gradual   Wt change past yr:  no change  Most wt lost: 5-10lbs cut out bread  Other diets attempted: Cut out bread    Desire to lose weight: 6/10  Problem posed by appetite: 6-7/10    Initial Diet:    Number of meals per day - 1-2    Number of snacks per day - 4-6    Meal volume -  \"  no plate  sometimes seconds    Fast food/convenience store - 3-4 x/week    Restaurants (not fast food) - 1-2x/week   Sweets - 7 d/week werther originals 10-12 pieces. Candy bars   Chips - 7 d/week BBQ salt & vinegar  1.2 oz   Crackers/pretzels - 1d/week Ritz 10 crackers    Nuts - 7 d/week 1 cup pistachios 3 # bag in 1 week    Peanut Butter - 0 d/week   Popcorn - 1-2 d/week Orvile Redinbacher  theater butter    Dried fruit - 2 d/week  dried apples & bananas   Whole fruit - 3 d/week clementines, grapes   Breakfast cereal - 7 d/week cocoa puffs  petty charms whole milk 1.cup   Granola/Protein/Energy bar - 0 d/week   Sugar sweetened beverages - G2 Sweet tea  gold peak 3-4 bottle x 7 days    Protein - No supplements   Fiber - No supplements   Multivitamin -none    Exercise:    Gym membership - no    Walking -  25519-34024 steps at work 40- 72 hours at work)    Running - no    Resistance - no    Aerobic class - no  ____________________________    On license of [facility] -  Past Medical History:   Diagnosis Date    Anxiety     Arthritis     Back pain     CHF (congestive heart failure) ([facility])     Chronic back pain 8/85    Congenital heart disease     Coronary artery disease involving native coronary artery of native heart without angina pectoris 08/22/2023    Depression     GERD (gastroesophageal reflux disease)     HFrEF (heart failure with reduced ejection fraction) ([facility])     History of blood transfusion     Hyperlipidemia

## 2024-03-14 NOTE — PATIENT INSTRUCTIONS
Weight  Date   225 lbs  02/05/2024   217.8 lbs 03/14/2024    Total weight change to date: -7.2 lbs    Patient's estimated daily energy need (DEN) = 2199 Bruce/d     Average daily energy variance:   2/5/2024 - 3/14/2024: -7.2 lbs/38 d = -664 Bruce/d deficit.  (3.2% change in total body weight)    Weight effect of high risk foods =  Restaurant 900 Bruce/week + Sweets 560 Bruce + Salty 1120 Bruce+ Nuts 2240 Bruce + Popcorn 638 Bruce + dried fruit 700 Bruce + Cereal 1960 Bruce + Milk 1050 bruce = 9168 Bruce/week 1310 Bruce/day = 60% AWD=414 lbs/year    Notes from previous visit:  He feels he needs an appetite suppressant, and after going over options and adverse effects. He was on Effexor previously and did not like the side effects ,we plan to start Wellbutrin .     Update:    Calorie Calorie concious not counting calories. Portion control   Protein: 2-3 protein shakes   Fiber:  bananas GOAL: Increase fiber slowly to 22 gm /day  Water: 100 oz of Propel   Micronutrients: none  Sweets:3 d/week dried fruit banana & apples  Cereal latricia puffs & petty charms  GOAL:  Try fiber one cereal ne cerl Omit sugary cereals   Non-sweet snacks: 0 d/week  SSB: Eliminated sweet tea.  Replaced  with G2 and propel   Restaurant food: 4 x in the month  Appetite suppressant: Wellbutrin  appetite 4/10  No side effects  Exercise: 13,000-17,000 steps a day     Assessment - Improving    Plan - Uncontrolled     Patient Instructions:    Take Bupropion XL (Wellbutrin XL) 150 mg, one tablet every morning.  While taking it, check the BP twice each day for one week. If the systolic BP is >155 mmHg or the diastolic BP is >90 mmHg consistently, then stop taking it.     Requirements:  Make sure protein intake is at least 65 grams per day (do not count protein every day; instead spot check your intake every 2-3 weeks and make sure what you think you are getting is close to accurate; consider using a protein shake if needed; these are in the pharmacy section of the stores, not the

## 2024-03-22 ENCOUNTER — APPOINTMENT (OUTPATIENT)
Dept: CT IMAGING | Age: 69
End: 2024-03-22
Payer: COMMERCIAL

## 2024-03-22 ENCOUNTER — APPOINTMENT (OUTPATIENT)
Dept: GENERAL RADIOLOGY | Age: 69
End: 2024-03-22
Payer: COMMERCIAL

## 2024-03-22 ENCOUNTER — HOSPITAL ENCOUNTER (EMERGENCY)
Age: 69
Discharge: HOME | End: 2024-03-22
Attending: STUDENT IN AN ORGANIZED HEALTH CARE EDUCATION/TRAINING PROGRAM
Payer: COMMERCIAL

## 2024-03-22 VITALS
DIASTOLIC BLOOD PRESSURE: 80 MMHG | TEMPERATURE: 97.6 F | RESPIRATION RATE: 20 BRPM | OXYGEN SATURATION: 95 % | SYSTOLIC BLOOD PRESSURE: 148 MMHG | WEIGHT: 217 LBS | HEART RATE: 54 BPM | HEIGHT: 67 IN | BODY MASS INDEX: 34.06 KG/M2

## 2024-03-22 DIAGNOSIS — R42 LIGHTHEADEDNESS: Primary | ICD-10-CM

## 2024-03-22 LAB
ALBUMIN SERPL-MCNC: 4.6 G/DL (ref 3.5–5.2)
ALP SERPL-CCNC: 75 U/L (ref 40–129)
ALT SERPL-CCNC: 33 U/L (ref 0–40)
ANION GAP SERPL CALCULATED.3IONS-SCNC: 12 MMOL/L (ref 7–16)
AST SERPL-CCNC: 31 U/L (ref 0–39)
BASOPHILS # BLD: 0.02 K/UL (ref 0–0.2)
BASOPHILS NFR BLD: 0 % (ref 0–2)
BILIRUB SERPL-MCNC: 0.6 MG/DL (ref 0–1.2)
BNP SERPL-MCNC: 182 PG/ML (ref 0–125)
BUN SERPL-MCNC: 25 MG/DL (ref 6–23)
CALCIUM SERPL-MCNC: 9.5 MG/DL (ref 8.6–10.2)
CHLORIDE SERPL-SCNC: 106 MMOL/L (ref 98–107)
CO2 SERPL-SCNC: 23 MMOL/L (ref 22–29)
CREAT SERPL-MCNC: 1.1 MG/DL (ref 0.7–1.2)
EOSINOPHIL # BLD: 0.06 K/UL (ref 0.05–0.5)
EOSINOPHILS RELATIVE PERCENT: 1 % (ref 0–6)
ERYTHROCYTE [DISTWIDTH] IN BLOOD BY AUTOMATED COUNT: 12.7 % (ref 11.5–15)
GFR SERPL CREATININE-BSD FRML MDRD: >60 ML/MIN/1.73M2
GLUCOSE SERPL-MCNC: 128 MG/DL (ref 74–99)
HCT VFR BLD AUTO: 46 % (ref 37–54)
HGB BLD-MCNC: 15.6 G/DL (ref 12.5–16.5)
IMM GRANULOCYTES # BLD AUTO: 0.04 K/UL (ref 0–0.58)
IMM GRANULOCYTES NFR BLD: 0 % (ref 0–5)
LYMPHOCYTES NFR BLD: 1.19 K/UL (ref 1.5–4)
LYMPHOCYTES RELATIVE PERCENT: 13 % (ref 20–42)
MCH RBC QN AUTO: 29.9 PG (ref 26–35)
MCHC RBC AUTO-ENTMCNC: 33.9 G/DL (ref 32–34.5)
MCV RBC AUTO: 88.3 FL (ref 80–99.9)
MONOCYTES NFR BLD: 0.35 K/UL (ref 0.1–0.95)
MONOCYTES NFR BLD: 4 % (ref 2–12)
NEUTROPHILS NFR BLD: 82 % (ref 43–80)
NEUTS SEG NFR BLD: 7.67 K/UL (ref 1.8–7.3)
PLATELET # BLD AUTO: 191 K/UL (ref 130–450)
PMV BLD AUTO: 10.6 FL (ref 7–12)
POTASSIUM SERPL-SCNC: 4.7 MMOL/L (ref 3.5–5)
PROT SERPL-MCNC: 7.6 G/DL (ref 6.4–8.3)
RBC # BLD AUTO: 5.21 M/UL (ref 3.8–5.8)
SODIUM SERPL-SCNC: 141 MMOL/L (ref 132–146)
TROPONIN I SERPL HS-MCNC: 14 NG/L (ref 0–11)
TROPONIN I SERPL HS-MCNC: 15 NG/L (ref 0–11)
TROPONIN I SERPL HS-MCNC: 27 NG/L (ref 0–11)
WBC OTHER # BLD: 9.3 K/UL (ref 4.5–11.5)

## 2024-03-22 PROCEDURE — 2580000003 HC RX 258

## 2024-03-22 PROCEDURE — 99285 EMERGENCY DEPT VISIT HI MDM: CPT

## 2024-03-22 PROCEDURE — 83880 ASSAY OF NATRIURETIC PEPTIDE: CPT

## 2024-03-22 PROCEDURE — 71045 X-RAY EXAM CHEST 1 VIEW: CPT

## 2024-03-22 PROCEDURE — 80053 COMPREHEN METABOLIC PANEL: CPT

## 2024-03-22 PROCEDURE — 85025 COMPLETE CBC W/AUTO DIFF WBC: CPT

## 2024-03-22 PROCEDURE — 70450 CT HEAD/BRAIN W/O DYE: CPT

## 2024-03-22 PROCEDURE — 84484 ASSAY OF TROPONIN QUANT: CPT

## 2024-03-22 PROCEDURE — 93005 ELECTROCARDIOGRAM TRACING: CPT

## 2024-03-22 RX ORDER — 0.9 % SODIUM CHLORIDE 0.9 %
1000 INTRAVENOUS SOLUTION INTRAVENOUS ONCE
Status: COMPLETED | OUTPATIENT
Start: 2024-03-22 | End: 2024-03-22

## 2024-03-22 RX ADMIN — SODIUM CHLORIDE 1000 ML: 9 INJECTION, SOLUTION INTRAVENOUS at 15:39

## 2024-03-22 ASSESSMENT — LIFESTYLE VARIABLES
HOW OFTEN DO YOU HAVE A DRINK CONTAINING ALCOHOL: NEVER
HOW MANY STANDARD DRINKS CONTAINING ALCOHOL DO YOU HAVE ON A TYPICAL DAY: PATIENT DOES NOT DRINK

## 2024-03-22 ASSESSMENT — ENCOUNTER SYMPTOMS
EYE REDNESS: 0
TROUBLE SWALLOWING: 0
VOMITING: 0
PHOTOPHOBIA: 0
BACK PAIN: 0
ABDOMINAL PAIN: 0
NAUSEA: 0
VOICE CHANGE: 0
SHORTNESS OF BREATH: 0
WHEEZING: 0
COUGH: 0
DIARRHEA: 0

## 2024-03-23 LAB
EKG ATRIAL RATE: 51 BPM
EKG P AXIS: 33 DEGREES
EKG P-R INTERVAL: 172 MS
EKG Q-T INTERVAL: 472 MS
EKG QRS DURATION: 102 MS
EKG QTC CALCULATION (BAZETT): 435 MS
EKG R AXIS: -31 DEGREES
EKG T AXIS: -50 DEGREES
EKG VENTRICULAR RATE: 51 BPM

## 2024-03-23 PROCEDURE — 93010 ELECTROCARDIOGRAM REPORT: CPT | Performed by: INTERNAL MEDICINE

## 2024-03-23 NOTE — ED PROVIDER NOTES
Nonischemic cardiomyopathy (HCC), and Sleep apnea.    Past Surgical History:  has a past surgical history that includes shoulder surgery (Left); Appendectomy; hernia repair (2001); Colonoscopy (2010); eye surgery; joint replacement (Left, 6/8/16); and prosthet device application (N/A, 6/27/2019).    Social History:  reports that he has never smoked. He has never used smokeless tobacco. He reports current alcohol use of about 1.0 standard drink of alcohol per week. He reports that he does not use drugs.    Family History: family history includes Cancer in his father and sister; Colon Cancer in his father; Diabetes in his mother; Heart Disease in his mother; Other in his father.     The patient’s home medications have been reviewed.    Allergies: Patient has no known allergies.  REVIEW OF EXTERNAL NOTE :      Chart reviewed:   Previous Echo reviewed by me:  Lab Results   Component Value Date    LVEF 58 08/16/2023     No results found for this or any previous visit.       Controlled Substance Monitored by me:   Acute and Chronic Pain Monitoring:        No data to display                RADIOLOGY:   Non-plain film images such as CT, Ultrasound and MRI are read by the radiologist. Plain radiographic images are visualized and preliminarily interpreted by the ED Provider with the findings as noted in MDM.    Final Interpretation per the Radiologist below, if available at the time of this note:    XR CHEST PORTABLE   Final Result   No acute cardiopulmonary disease.         CT Head W/O Contrast   Final Result   No acute intracranial abnormality.           XR CHEST PORTABLE    Result Date: 3/22/2024  EXAMINATION: ONE XRAY VIEW OF THE CHEST 3/22/2024 2:55 pm COMPARISON: None. HISTORY: ORDERING SYSTEM PROVIDED HISTORY: lightheadedness TECHNOLOGIST PROVIDED HISTORY: Reason for exam:->lightheadedness What reading provider will be dictating this exam?->CRC FINDINGS: The lungs are clear, with no sign of any infiltrate or effusion.

## 2024-03-25 ENCOUNTER — TELEPHONE (OUTPATIENT)
Dept: ADMINISTRATIVE | Age: 69
End: 2024-03-25

## 2024-03-25 NOTE — TELEPHONE ENCOUNTER
Pt calling for ER f/u apt/timing with Dr. Harden from 3/22/24 dx: Dizziness/lightheadedness.  He currently has a 6 mth OV with Dr. Harden already scheduled for: 5/20/24.

## 2024-04-02 ENCOUNTER — OFFICE VISIT (OUTPATIENT)
Dept: PRIMARY CARE CLINIC | Age: 69
End: 2024-04-02
Payer: COMMERCIAL

## 2024-04-02 VITALS
TEMPERATURE: 97.7 F | HEIGHT: 68 IN | DIASTOLIC BLOOD PRESSURE: 92 MMHG | BODY MASS INDEX: 32.74 KG/M2 | SYSTOLIC BLOOD PRESSURE: 160 MMHG | WEIGHT: 216 LBS | HEART RATE: 72 BPM | OXYGEN SATURATION: 98 %

## 2024-04-02 DIAGNOSIS — I10 PRIMARY HYPERTENSION: Primary | ICD-10-CM

## 2024-04-02 DIAGNOSIS — M47.816 SPONDYLOSIS OF LUMBAR REGION WITHOUT MYELOPATHY OR RADICULOPATHY: ICD-10-CM

## 2024-04-02 PROCEDURE — 99213 OFFICE O/P EST LOW 20 MIN: CPT | Performed by: INTERNAL MEDICINE

## 2024-04-02 PROCEDURE — 96372 THER/PROPH/DIAG INJ SC/IM: CPT | Performed by: INTERNAL MEDICINE

## 2024-04-02 PROCEDURE — 3080F DIAST BP >= 90 MM HG: CPT | Performed by: INTERNAL MEDICINE

## 2024-04-02 PROCEDURE — 3077F SYST BP >= 140 MM HG: CPT | Performed by: INTERNAL MEDICINE

## 2024-04-02 PROCEDURE — 1123F ACP DISCUSS/DSCN MKR DOCD: CPT | Performed by: INTERNAL MEDICINE

## 2024-04-02 RX ORDER — LISINOPRIL 10 MG/1
10 TABLET ORAL 2 TIMES DAILY
Qty: 180 TABLET | Refills: 1 | Status: SHIPPED | OUTPATIENT
Start: 2024-04-02

## 2024-04-02 RX ORDER — KETOROLAC TROMETHAMINE 30 MG/ML
60 INJECTION, SOLUTION INTRAMUSCULAR; INTRAVENOUS ONCE
Status: COMPLETED | OUTPATIENT
Start: 2024-04-02 | End: 2024-04-02

## 2024-04-02 RX ORDER — GABAPENTIN 300 MG/1
300 CAPSULE ORAL EVERY EVENING
Qty: 30 CAPSULE | Refills: 0 | Status: SHIPPED | OUTPATIENT
Start: 2024-04-02 | End: 2024-05-02

## 2024-04-02 RX ADMIN — KETOROLAC TROMETHAMINE 60 MG: 30 INJECTION, SOLUTION INTRAMUSCULAR; INTRAVENOUS at 10:00

## 2024-04-02 NOTE — PROGRESS NOTES
Al Butler (:  1955) is a 68 y.o. male,Established patient, here for evaluation of the following chief complaint(s):  Lower Back Pain (X 3-4 days)      Subjective   SUBJECTIVE/OBJECTIVE:  HPI  Back pain   Has chronic back pain.  He has been doing physical work lately.  Pain is much more than before.  Making him unable to do his daily activities.  He has been following up with the pain clinic he is on Norco.  It is not helping his symptoms currently he is also on muscle relaxer.  He has no radiation of the pain to his extremities.  Review of Systems   All other systems reviewed and are negative.    CBC with Differential:    Lab Results   Component Value Date/Time    WBC 9.3 2024 01:58 PM    RBC 5.21 2024 01:58 PM    HGB 15.6 2024 01:58 PM    HCT 46.0 2024 01:58 PM     2024 01:58 PM    MCV 88.3 2024 01:58 PM    MCH 29.9 2024 01:58 PM    MCHC 33.9 2024 01:58 PM    RDW 12.7 2024 01:58 PM    LYMPHOPCT 13 2024 01:58 PM    MONOPCT 4 2024 01:58 PM    BASOPCT 0 2024 01:58 PM    MONOSABS 0.35 2024 01:58 PM    LYMPHSABS 1.19 2024 01:58 PM    EOSABS 0.06 2024 01:58 PM    BASOSABS 0.02 2024 01:58 PM     CMP:    Lab Results   Component Value Date/Time     2024 01:58 PM    K 4.7 2024 01:58 PM     2024 01:58 PM    CO2 23 2024 01:58 PM    BUN 25 2024 01:58 PM    CREATININE 1.1 2024 01:58 PM    GFRAA >60 2022 09:19 AM    LABGLOM >60 2024 01:58 PM    GLUCOSE 128 2024 01:58 PM    PROT 7.6 2024 01:58 PM    LABALBU 4.6 2024 01:58 PM    CALCIUM 9.5 2024 01:58 PM    BILITOT 0.6 2024 01:58 PM    ALKPHOS 75 2024 01:58 PM    AST 31 2024 01:58 PM    ALT 33 2024 01:58 PM     Lipid:   Lab Results   Component Value Date    CHOL 100 2022    CHOL 198 2022     Lab Results   Component Value Date    TRIG 72

## 2024-04-08 SDOH — HEALTH STABILITY: PHYSICAL HEALTH: ON AVERAGE, HOW MANY DAYS PER WEEK DO YOU ENGAGE IN MODERATE TO STRENUOUS EXERCISE (LIKE A BRISK WALK)?: 0 DAYS

## 2024-04-08 ASSESSMENT — LIFESTYLE VARIABLES
HOW MANY STANDARD DRINKS CONTAINING ALCOHOL DO YOU HAVE ON A TYPICAL DAY: 1
HOW OFTEN DO YOU HAVE A DRINK CONTAINING ALCOHOL: 3
HOW OFTEN DO YOU HAVE SIX OR MORE DRINKS ON ONE OCCASION: 1
HOW MANY STANDARD DRINKS CONTAINING ALCOHOL DO YOU HAVE ON A TYPICAL DAY: 1 OR 2
HOW OFTEN DO YOU HAVE A DRINK CONTAINING ALCOHOL: 2-4 TIMES A MONTH

## 2024-04-08 ASSESSMENT — PATIENT HEALTH QUESTIONNAIRE - PHQ9
2. FEELING DOWN, DEPRESSED OR HOPELESS: NOT AT ALL
SUM OF ALL RESPONSES TO PHQ QUESTIONS 1-9: 0
1. LITTLE INTEREST OR PLEASURE IN DOING THINGS: NOT AT ALL
SUM OF ALL RESPONSES TO PHQ QUESTIONS 1-9: 0
SUM OF ALL RESPONSES TO PHQ QUESTIONS 1-9: 0
SUM OF ALL RESPONSES TO PHQ9 QUESTIONS 1 & 2: 0
SUM OF ALL RESPONSES TO PHQ QUESTIONS 1-9: 0

## 2024-04-10 DIAGNOSIS — I50.20 HFREF (HEART FAILURE WITH REDUCED EJECTION FRACTION) (HCC): ICD-10-CM

## 2024-04-10 DIAGNOSIS — I10 ESSENTIAL (PRIMARY) HYPERTENSION: ICD-10-CM

## 2024-04-10 DIAGNOSIS — Z11.59 NEED FOR HEPATITIS C SCREENING TEST: ICD-10-CM

## 2024-04-10 DIAGNOSIS — E78.2 MIXED HYPERLIPIDEMIA: ICD-10-CM

## 2024-04-10 DIAGNOSIS — I42.8 NONISCHEMIC CARDIOMYOPATHY (HCC): ICD-10-CM

## 2024-04-10 DIAGNOSIS — R73.01 IMPAIRED FASTING GLUCOSE: ICD-10-CM

## 2024-04-10 LAB
ALBUMIN SERPL-MCNC: 4.1 G/DL (ref 3.5–5.2)
ALP BLD-CCNC: 64 U/L (ref 40–129)
ALT SERPL-CCNC: 33 U/L (ref 0–40)
ANION GAP SERPL CALCULATED.3IONS-SCNC: 15 MMOL/L (ref 7–16)
AST SERPL-CCNC: 26 U/L (ref 0–39)
BASOPHILS ABSOLUTE: 0.02 K/UL (ref 0–0.2)
BASOPHILS RELATIVE PERCENT: 0 % (ref 0–2)
BILIRUB SERPL-MCNC: 0.7 MG/DL (ref 0–1.2)
BUN BLDV-MCNC: 18 MG/DL (ref 6–23)
CALCIUM SERPL-MCNC: 9.2 MG/DL (ref 8.6–10.2)
CHLORIDE BLD-SCNC: 110 MMOL/L (ref 98–107)
CHOLESTEROL, FASTING: 98 MG/DL
CO2: 20 MMOL/L (ref 22–29)
CREAT SERPL-MCNC: 1.1 MG/DL (ref 0.7–1.2)
EOSINOPHILS ABSOLUTE: 0.08 K/UL (ref 0.05–0.5)
EOSINOPHILS RELATIVE PERCENT: 1 % (ref 0–6)
GFR SERPL CREATININE-BSD FRML MDRD: 75 ML/MIN/1.73M2
GLUCOSE BLD-MCNC: 100 MG/DL (ref 74–99)
HBA1C MFR BLD: 5.7 % (ref 4–5.6)
HCT VFR BLD CALC: 45.4 % (ref 37–54)
HDLC SERPL-MCNC: 38 MG/DL
HEMOGLOBIN: 14.4 G/DL (ref 12.5–16.5)
IMMATURE GRANULOCYTES %: 0 % (ref 0–5)
IMMATURE GRANULOCYTES ABSOLUTE: <0.03 K/UL (ref 0–0.58)
LDL CHOLESTEROL: 51 MG/DL
LYMPHOCYTES ABSOLUTE: 1.78 K/UL (ref 1.5–4)
LYMPHOCYTES RELATIVE PERCENT: 32 % (ref 20–42)
MCH RBC QN AUTO: 28.9 PG (ref 26–35)
MCHC RBC AUTO-ENTMCNC: 31.7 G/DL (ref 32–34.5)
MCV RBC AUTO: 91 FL (ref 80–99.9)
MONOCYTES ABSOLUTE: 0.43 K/UL (ref 0.1–0.95)
MONOCYTES RELATIVE PERCENT: 8 % (ref 2–12)
NEUTROPHILS ABSOLUTE: 3.22 K/UL (ref 1.8–7.3)
NEUTROPHILS RELATIVE PERCENT: 58 % (ref 43–80)
PDW BLD-RTO: 13.1 % (ref 11.5–15)
PLATELET # BLD: 182 K/UL (ref 130–450)
PMV BLD AUTO: 10.7 FL (ref 7–12)
POTASSIUM SERPL-SCNC: 4.4 MMOL/L (ref 3.5–5)
RBC # BLD: 4.99 M/UL (ref 3.8–5.8)
SODIUM BLD-SCNC: 145 MMOL/L (ref 132–146)
TOTAL PROTEIN: 7 G/DL (ref 6.4–8.3)
TRIGLYCERIDE, FASTING: 47 MG/DL
VLDLC SERPL CALC-MCNC: 9 MG/DL
WBC # BLD: 5.6 K/UL (ref 4.5–11.5)

## 2024-04-11 ENCOUNTER — OFFICE VISIT (OUTPATIENT)
Dept: PRIMARY CARE CLINIC | Age: 69
End: 2024-04-11
Payer: COMMERCIAL

## 2024-04-11 VITALS
RESPIRATION RATE: 16 BRPM | DIASTOLIC BLOOD PRESSURE: 88 MMHG | BODY MASS INDEX: 32.74 KG/M2 | SYSTOLIC BLOOD PRESSURE: 138 MMHG | WEIGHT: 216 LBS | HEART RATE: 61 BPM | HEIGHT: 68 IN | OXYGEN SATURATION: 94 % | TEMPERATURE: 97.2 F

## 2024-04-11 DIAGNOSIS — R73.01 IMPAIRED FASTING GLUCOSE: ICD-10-CM

## 2024-04-11 DIAGNOSIS — I50.20 HFREF (HEART FAILURE WITH REDUCED EJECTION FRACTION) (HCC): ICD-10-CM

## 2024-04-11 DIAGNOSIS — I10 PRIMARY HYPERTENSION: ICD-10-CM

## 2024-04-11 DIAGNOSIS — E78.2 MIXED HYPERLIPIDEMIA: ICD-10-CM

## 2024-04-11 DIAGNOSIS — I42.8 NONISCHEMIC CARDIOMYOPATHY (HCC): ICD-10-CM

## 2024-04-11 DIAGNOSIS — Z00.00 MEDICARE ANNUAL WELLNESS VISIT, SUBSEQUENT: Primary | ICD-10-CM

## 2024-04-11 LAB — HEPATITIS C ANTIBODY: NONREACTIVE

## 2024-04-11 PROCEDURE — 3075F SYST BP GE 130 - 139MM HG: CPT | Performed by: INTERNAL MEDICINE

## 2024-04-11 PROCEDURE — G0439 PPPS, SUBSEQ VISIT: HCPCS | Performed by: INTERNAL MEDICINE

## 2024-04-11 PROCEDURE — 3079F DIAST BP 80-89 MM HG: CPT | Performed by: INTERNAL MEDICINE

## 2024-04-11 PROCEDURE — 99214 OFFICE O/P EST MOD 30 MIN: CPT | Performed by: INTERNAL MEDICINE

## 2024-04-11 PROCEDURE — 1123F ACP DISCUSS/DSCN MKR DOCD: CPT | Performed by: INTERNAL MEDICINE

## 2024-04-11 ASSESSMENT — ENCOUNTER SYMPTOMS
RHINORRHEA: 0
GASTROINTESTINAL NEGATIVE: 1
RESPIRATORY NEGATIVE: 1
EYES NEGATIVE: 1
EYE ITCHING: 0
FACIAL SWELLING: 0
EYE REDNESS: 0
EYE PAIN: 0
EYE DISCHARGE: 0
ALLERGIC/IMMUNOLOGIC NEGATIVE: 1
SINUS PAIN: 0

## 2024-04-11 NOTE — ADDENDUM NOTE
Addended by: QUINTEN GARCIA on: 4/11/2024 09:22 AM     Modules accepted: Orders, Level of Service

## 2024-04-11 NOTE — PATIENT INSTRUCTIONS

## 2024-04-11 NOTE — PROGRESS NOTES
Al Butler (:  1955) is a 68 y.o. male,Established patient, here for evaluation of the following chief complaint(s):  Medicare AWV      Subjective   SUBJECTIVE/OBJECTIVE:  HPI  Hypertension:  Patient is here for follow up chronic hypertension.  This is  generally controlled on current medication regimen.    BP Readings from Last 1 Encounters:   24 138/88        Takes meds as directed and tolerates them well.  Most recent labs reviewed with patient and are not remarkable.  No symptoms from htn standpoint per ROS.  Patient is  compliant with lifestyle modifications.  Patient does not smoke.  Comorbid conditions include obesity  Obstructive sleep apnea  Is tired he sleeps about 6-7 hours/day he cannot use a CPAP machine because he cannot tolerate the mask on his face appointment with sleep medicine on the  of this month  Nonischemic cardiomyopathy  Have been to cardiology recently echo showed normal EF mild dilatation of the left ventricle patient is improving his blood pressure is running on the low side and he is taking lisinopril 10 mg twice daily    Review of Systems   Constitutional: Negative.  Negative for activity change, appetite change, chills, fatigue and fever.   HENT: Negative.  Negative for congestion, ear pain, facial swelling, hearing loss, postnasal drip, rhinorrhea and sinus pain.    Eyes: Negative.  Negative for pain, discharge, redness and itching.   Respiratory: Negative.     Cardiovascular: Negative.    Gastrointestinal: Negative.    Endocrine: Negative.    Genitourinary: Negative.    Musculoskeletal: Negative.    Skin:         Nonhealing ulcer left eyebrow   Allergic/Immunologic: Negative.    Neurological: Negative.    Hematological: Negative.    Psychiatric/Behavioral: Negative.       CMP:    Lab Results   Component Value Date/Time     04/10/2024 08:36 AM    K 4.4 04/10/2024 08:36 AM     04/10/2024 08:36 AM    CO2 20 04/10/2024 08:36 AM    BUN 18 04/10/2024

## 2024-04-11 NOTE — PROGRESS NOTES
Medicare Annual Wellness Visit    Al Butler is here for Medicare AWV    Assessment & Plan   Medicare annual wellness visit, subsequent  Mixed hyperlipidemia  Impaired fasting glucose  Primary hypertension    Recommendations for Preventive Services Due: see orders and patient instructions/AVS.  Recommended screening schedule for the next 5-10 years is provided to the patient in written form: see Patient Instructions/AVS.     Return for Medicare Annual Wellness Visit in 1 year.     Subjective   The following acute and/or chronic problems were also addressed today:  HTN chronic CHF hyperlipidemia    Patient's complete Health Risk Assessment and screening values have been reviewed and are found in Flowsheets. The following problems were reviewed today and where indicated follow up appointments were made and/or referrals ordered.    Positive Risk Factor Screenings with Interventions:            Controlled Medication Review:      Today's Pain Level: Pain Score: Four     Opioid Risk: (Low risk score <55) Opioid risk score: 11    Patient is low risk for opioid use disorder or overdose.    Last PDMP Gideon as Reviewed:  Review User Review Instant Review Result   BRINDA STEPHENSONINE 2/5/2024  2:31 PM     Reviewed PDMP [1]        Activity, Diet, and Weight:  On average, how many days per week do you engage in moderate to strenuous exercise (like a brisk walk)?: 0 days       Do you eat balanced/healthy meals regularly?: (!) No    Body mass index is 32.85 kg/m². (!) Abnormal    Do you eat balanced/healthy meals regularly Interventions:  Started diet  Obesity Interventions:  Diet and exercise            Dentist Screen:  Have you seen the dentist within the past year?: (!) No    Intervention:  Advised to schedule with their dentist    Hearing Screen:  Do you or your family notice any trouble with your hearing that hasn't been managed with hearing aids?: (!) Yes    Interventions:  Referred to Audiology    Vision Screen:  Do you

## 2024-05-08 DIAGNOSIS — M47.816 SPONDYLOSIS OF LUMBAR REGION WITHOUT MYELOPATHY OR RADICULOPATHY: ICD-10-CM

## 2024-05-08 RX ORDER — GABAPENTIN 300 MG/1
300 CAPSULE ORAL DAILY
Qty: 90 CAPSULE | Refills: 0 | Status: SHIPPED | OUTPATIENT
Start: 2024-05-08 | End: 2024-08-06

## 2024-05-23 ENCOUNTER — TRANSCRIBE ORDERS (OUTPATIENT)
Dept: ADMINISTRATIVE | Age: 69
End: 2024-05-23

## 2024-05-23 DIAGNOSIS — M47.816 LUMBAR SPONDYLOSIS: Primary | ICD-10-CM

## 2024-05-31 LAB
ANION GAP SERPL CALCULATED.3IONS-SCNC: 10 MMOL/L (ref 7–16)
BUN SERPL-MCNC: 20 MG/DL (ref 6–23)
CALCIUM SERPL-MCNC: 9.4 MG/DL (ref 8.6–10.2)
CHLORIDE SERPL-SCNC: 103 MMOL/L (ref 98–107)
CO2 SERPL-SCNC: 28 MMOL/L (ref 22–29)
CREAT SERPL-MCNC: 1.3 MG/DL (ref 0.7–1.2)
ERYTHROCYTE [DISTWIDTH] IN BLOOD BY AUTOMATED COUNT: 12.9 % (ref 11.5–15)
GFR, ESTIMATED: 61 ML/MIN/1.73M2
GLUCOSE SERPL-MCNC: 92 MG/DL (ref 74–99)
HCT VFR BLD AUTO: 44.7 % (ref 37–54)
HGB BLD-MCNC: 13.9 G/DL (ref 12.5–16.5)
INR PPP: 1.1
MCH RBC QN AUTO: 29 PG (ref 26–35)
MCHC RBC AUTO-ENTMCNC: 31.1 G/DL (ref 32–34.5)
MCV RBC AUTO: 93.1 FL (ref 80–99.9)
PLATELET # BLD AUTO: 205 K/UL (ref 130–450)
PMV BLD AUTO: 10.7 FL (ref 7–12)
POTASSIUM SERPL-SCNC: 5.1 MMOL/L (ref 3.5–5)
PROTHROMBIN TIME: 11.5 SEC (ref 9.3–12.4)
RBC # BLD AUTO: 4.8 M/UL (ref 3.8–5.8)
SODIUM SERPL-SCNC: 141 MMOL/L (ref 132–146)
WBC OTHER # BLD: 6.6 K/UL (ref 4.5–11.5)

## 2024-06-03 ENCOUNTER — HOSPITAL ENCOUNTER (OUTPATIENT)
Dept: PREADMISSION TESTING | Age: 69
Discharge: HOME | End: 2024-06-03

## 2024-06-03 NOTE — PROGRESS NOTES
have a ride home or someone to stay with you.    PEDIATRIC PATIENTS ONLY:  A parent/legal guardian must accompany a child scheduled for surgery and plan to stay at the hospital until the child is discharged.  Please do not bring other children with you.    Please wear simple, loose fitting clothing to the hospital.  Do not bring valuables (money, credit cards, checkbooks, etc.) Do not wear any makeup (including no eye makeup) or nail polish on your fingers or toes.    DO NOT wear any jewelry or piercings on day of surgery.  All body piercings and jewelry must be removed.    Shower the night before surgery with an _x__ Antibacterial soap /antibacterial (Ras) wipes _______    HYSTERECTOMY patients ONLY - Remember to bring the green Blood Bank bracelet to the hospital on the day of surgery.    If you have a Living Will and/or Durable Power of  for Healthcare, please bring in a copy.    If appropriate bring crutches, inspirex, walker, cane etc...    Notify your Surgeon if you develop any illness between now and surgery time, cough, cold, fever, sore throat, nausea, vomiting, etc.  Please notify your surgeon if you experience dizziness, shortness of breath or blurred vision between now & the time of your surgery.    If you wear dentures, they will need to be removed before going into surgery, we will provide you with a container. If you wear contact lenses or glasses, they will need to be removed, please bring a case for them.    To provide excellent care visitors will be limited to 1 person in the room at any given time.                                                                                         No children under the age of 16 are permitted in the hospital for the safety of all patients.     Any implantable device requiring remote therapy, Please bring remote day of surgery and bring your implant card with you!      21. If you use a C-PAP please bring settings with you, do not bring the

## 2024-06-05 ENCOUNTER — PREP FOR PROCEDURE (OUTPATIENT)
Dept: CARDIOLOGY | Age: 69
End: 2024-06-05

## 2024-06-05 ENCOUNTER — HOSPITAL ENCOUNTER (OUTPATIENT)
Age: 69
Setting detail: OUTPATIENT SURGERY
Discharge: HOME | End: 2024-06-05
Attending: SPECIALIST | Admitting: SPECIALIST
Payer: COMMERCIAL

## 2024-06-05 VITALS
RESPIRATION RATE: 16 BRPM | WEIGHT: 208 LBS | TEMPERATURE: 97.6 F | DIASTOLIC BLOOD PRESSURE: 102 MMHG | BODY MASS INDEX: 31.52 KG/M2 | HEIGHT: 68 IN | HEART RATE: 68 BPM | OXYGEN SATURATION: 99 % | SYSTOLIC BLOOD PRESSURE: 154 MMHG

## 2024-06-05 DIAGNOSIS — I25.10 CAD (CORONARY ARTERY DISEASE): ICD-10-CM

## 2024-06-05 LAB
ECHO BSA: 2.13 M2
EKG ATRIAL RATE: 55 BPM
EKG P AXIS: 36 DEGREES
EKG P-R INTERVAL: 170 MS
EKG Q-T INTERVAL: 464 MS
EKG QRS DURATION: 94 MS
EKG QTC CALCULATION (BAZETT): 443 MS
EKG R AXIS: -37 DEGREES
EKG T AXIS: -32 DEGREES
EKG VENTRICULAR RATE: 55 BPM

## 2024-06-05 PROCEDURE — 93458 L HRT ARTERY/VENTRICLE ANGIO: CPT | Performed by: SPECIALIST

## 2024-06-05 PROCEDURE — 93010 ELECTROCARDIOGRAM REPORT: CPT | Performed by: INTERNAL MEDICINE

## 2024-06-05 PROCEDURE — 93005 ELECTROCARDIOGRAM TRACING: CPT | Performed by: SPECIALIST

## 2024-06-05 PROCEDURE — 7100000001 HC PACU RECOVERY - ADDTL 15 MIN: Performed by: SPECIALIST

## 2024-06-05 PROCEDURE — 6360000002 HC RX W HCPCS: Performed by: SPECIALIST

## 2024-06-05 PROCEDURE — 7100000010 HC PHASE II RECOVERY - FIRST 15 MIN: Performed by: SPECIALIST

## 2024-06-05 PROCEDURE — 99152 MOD SED SAME PHYS/QHP 5/>YRS: CPT | Performed by: SPECIALIST

## 2024-06-05 PROCEDURE — C1769 GUIDE WIRE: HCPCS | Performed by: SPECIALIST

## 2024-06-05 PROCEDURE — 7100000000 HC PACU RECOVERY - FIRST 15 MIN: Performed by: SPECIALIST

## 2024-06-05 PROCEDURE — 2709999900 HC NON-CHARGEABLE SUPPLY: Performed by: SPECIALIST

## 2024-06-05 PROCEDURE — 7100000011 HC PHASE II RECOVERY - ADDTL 15 MIN: Performed by: SPECIALIST

## 2024-06-05 PROCEDURE — 93458 L HRT ARTERY/VENTRICLE ANGIO: CPT

## 2024-06-05 PROCEDURE — 2500000003 HC RX 250 WO HCPCS: Performed by: SPECIALIST

## 2024-06-05 PROCEDURE — C1894 INTRO/SHEATH, NON-LASER: HCPCS | Performed by: SPECIALIST

## 2024-06-05 PROCEDURE — 6360000004 HC RX CONTRAST MEDICATION: Performed by: SPECIALIST

## 2024-06-05 RX ORDER — MIDAZOLAM HYDROCHLORIDE 1 MG/ML
INJECTION INTRAMUSCULAR; INTRAVENOUS PRN
Status: DISCONTINUED | OUTPATIENT
Start: 2024-06-05 | End: 2024-06-05 | Stop reason: HOSPADM

## 2024-06-05 RX ORDER — SODIUM CHLORIDE 0.9 % (FLUSH) 0.9 %
5-40 SYRINGE (ML) INJECTION PRN
Status: DISCONTINUED | OUTPATIENT
Start: 2024-06-05 | End: 2024-06-05 | Stop reason: HOSPADM

## 2024-06-05 RX ORDER — SODIUM CHLORIDE 0.9 % (FLUSH) 0.9 %
5-40 SYRINGE (ML) INJECTION EVERY 12 HOURS SCHEDULED
Status: CANCELLED | OUTPATIENT
Start: 2024-06-05

## 2024-06-05 RX ORDER — FENTANYL CITRATE 50 UG/ML
INJECTION, SOLUTION INTRAMUSCULAR; INTRAVENOUS PRN
Status: DISCONTINUED | OUTPATIENT
Start: 2024-06-05 | End: 2024-06-05 | Stop reason: HOSPADM

## 2024-06-05 RX ORDER — ACETAMINOPHEN 325 MG/1
650 TABLET ORAL EVERY 4 HOURS PRN
Status: DISCONTINUED | OUTPATIENT
Start: 2024-06-05 | End: 2024-06-05 | Stop reason: HOSPADM

## 2024-06-05 RX ORDER — SODIUM CHLORIDE 9 MG/ML
INJECTION, SOLUTION INTRAVENOUS PRN
Status: CANCELLED | OUTPATIENT
Start: 2024-06-05

## 2024-06-05 RX ORDER — SODIUM CHLORIDE 0.9 % (FLUSH) 0.9 %
5-40 SYRINGE (ML) INJECTION PRN
Status: CANCELLED | OUTPATIENT
Start: 2024-06-05

## 2024-06-05 RX ORDER — HYDRALAZINE HYDROCHLORIDE 20 MG/ML
INJECTION INTRAMUSCULAR; INTRAVENOUS PRN
Status: DISCONTINUED | OUTPATIENT
Start: 2024-06-05 | End: 2024-06-05 | Stop reason: HOSPADM

## 2024-06-05 RX ORDER — SODIUM CHLORIDE 0.9 % (FLUSH) 0.9 %
5-40 SYRINGE (ML) INJECTION EVERY 12 HOURS SCHEDULED
Status: DISCONTINUED | OUTPATIENT
Start: 2024-06-05 | End: 2024-06-05 | Stop reason: HOSPADM

## 2024-06-05 RX ORDER — SODIUM CHLORIDE 9 MG/ML
INJECTION, SOLUTION INTRAVENOUS PRN
Status: DISCONTINUED | OUTPATIENT
Start: 2024-06-05 | End: 2024-06-05 | Stop reason: HOSPADM

## 2024-06-05 ASSESSMENT — PAIN - FUNCTIONAL ASSESSMENT: PAIN_FUNCTIONAL_ASSESSMENT: NONE - DENIES PAIN

## 2024-06-05 NOTE — DISCHARGE INSTRUCTIONS
Post Femoral (Groin) Cardia Catheterization Discharge Instructions      Please follow instructions closely for prevention of complications.      Special Instructions:     Splint catheterization site with activity today   Splint catheterization site with:    ~Changing Positions    ~Coughing    ~Sneezing    ~Laughing      Call your physician if you notice:  ~increased pain at the catheterization site  ~increase swelling at the catheterization site  ~redness or drainage at the catheterization site  ~numbness, tingling or cramping in the catheterization leg at rest or with activity      *Remove dressing 24 hours after applied   ~Soak with water in shower and gently peel off.  Clean site with soap and water, dry, and apply band aide for 24 hours.  Then remove band aide after another 24 hours.  *Drink 3 - 4 extra glasses of water today  *No tub bathing, swimming or hot tub for 3 days  *No stairs for 24 hours.  Minimal walking today.  *No driving, working, or sexual activity for 48 hours  *Do not lift anything heavier than 10 pounds for 3 days      **If you have any questions or problems after discharge, please notify your doctor.  Call 9-1-1 if you have active bleeding from your catheterization site.    ***DO NOT DRIVE YOURSELF TO THE HOSPITAL.  Lay down and apply pressure to site until help arrives.      Follow up with Primary Care Physician in 2 weeks.    Follow up with Cardiologist in 2 - 4 weeks.    Please call to make your appointments after you are discharged.

## 2024-06-05 NOTE — PROGRESS NOTES
Patient came down to special procedures for cardiac catheterization with Dr. Smith.       Patient tolerated procedure.  Pressure held for 6 minutes to right groin.   Dry sterile dressing of folded 4 x 4 tegaderm applied to right groin, CDI.  Educated patient to keep head down and right leg straight and flat for 4 hours. Patient voiced understanding.  Vital signs stable.       Total amount of sedation medication given during procedure: 1 mg of IV Versed and 50 mcg of IV Fentanyl    10 mg of IV hydralazine given     bilateral pedal pulses +2, bilateral post tibial pulses +3, skin color pale, no numbness or tingling to feet    Right groin no redness, hematoma    1455 - post procedure /101 73 16 97% on room air      nurse to nurse called, spoke with Sherley RN, nurse notified of above information.  Nurse assumed post sedation vital signs    Patient transported back to the [facility].

## 2024-06-10 ENCOUNTER — HOSPITAL ENCOUNTER (OUTPATIENT)
Dept: MRI IMAGING | Age: 69
Discharge: HOME | End: 2024-06-12
Payer: COMMERCIAL

## 2024-06-10 DIAGNOSIS — M47.816 LUMBAR SPONDYLOSIS: ICD-10-CM

## 2024-06-10 PROCEDURE — 72148 MRI LUMBAR SPINE W/O DYE: CPT

## 2024-06-17 NOTE — PROGRESS NOTES
Types: 1 Drinks containing 0.5 oz of alcohol per week     Comment: rare    Drug use: Never    Sexual activity: Yes     Partners: Female   Other Topics Concern    Not on file   Social History Narrative    Drinks 6-8 iced tea or pop daily     Social Determinants of Health     Financial Resource Strain: Low Risk  (7/16/2024)    Overall Financial Resource Strain (CARDIA)     Difficulty of Paying Living Expenses: Not hard at all   Food Insecurity: No Food Insecurity (7/16/2024)    Hunger Vital Sign     Worried About Running Out of Food in the Last Year: Never true     Ran Out of Food in the Last Year: Never true   Transportation Needs: Unknown (7/16/2024)    PRAPARE - Transportation     Lack of Transportation (Medical): Not on file     Lack of Transportation (Non-Medical): No   Physical Activity: Unknown (4/8/2024)    Exercise Vital Sign     Days of Exercise per Week: 0 days     Minutes of Exercise per Session: Not on file   Stress: Not on file   Social Connections: Not on file   Intimate Partner Violence: Not on file   Housing Stability: Unknown (7/16/2024)    Housing Stability Vital Sign     Unable to Pay for Housing in the Last Year: Not on file     Number of Places Lived in the Last Year: Not on file     Unstable Housing in the Last Year: No      Health Maintenance Due   Topic Date Due    Shingles vaccine (1 of 2) Never done    Respiratory Syncytial Virus (RSV) Pregnant or age 60 yrs+ (1 - 1-dose 60+ series) Never done    Pneumococcal 65+ years Vaccine (2 of 2 - PPSV23 or PCV20) 01/03/2022    DTaP/Tdap/Td vaccine (2 - Td or Tdap) 10/18/2022    COVID-19 Vaccine (3 - 2023-24 season) 09/01/2023    lski    Physical Exam  Constitutional:       General: He is not in acute distress.     Appearance: Normal appearance. He is normal weight.   HENT:      Head: Normocephalic and atraumatic.      Right Ear: Tympanic membrane, ear canal and external ear normal.      Left Ear: Tympanic membrane, ear canal and external ear normal.

## 2024-06-19 LAB — ECHO BSA: 2.13 M2

## 2024-07-08 DIAGNOSIS — R73.01 IMPAIRED FASTING GLUCOSE: ICD-10-CM

## 2024-07-08 DIAGNOSIS — I10 PRIMARY HYPERTENSION: ICD-10-CM

## 2024-07-08 DIAGNOSIS — E78.2 MIXED HYPERLIPIDEMIA: ICD-10-CM

## 2024-07-08 LAB
ALBUMIN: 4.4 G/DL (ref 3.5–5.2)
ALP BLD-CCNC: 66 U/L (ref 40–129)
ALT SERPL-CCNC: 28 U/L (ref 0–40)
ANION GAP SERPL CALCULATED.3IONS-SCNC: 10 MMOL/L (ref 7–16)
AST SERPL-CCNC: 29 U/L (ref 0–39)
BILIRUB SERPL-MCNC: 0.6 MG/DL (ref 0–1.2)
BUN BLDV-MCNC: 19 MG/DL (ref 6–23)
CALCIUM SERPL-MCNC: 9.2 MG/DL (ref 8.6–10.2)
CHLORIDE BLD-SCNC: 105 MMOL/L (ref 98–107)
CHOLESTEROL, FASTING: 114 MG/DL
CO2: 24 MMOL/L (ref 22–29)
CREAT SERPL-MCNC: 1.2 MG/DL (ref 0.7–1.2)
GFR, ESTIMATED: 64 ML/MIN/1.73M2
GLUCOSE BLD-MCNC: 82 MG/DL (ref 74–99)
HCT VFR BLD CALC: 44.2 % (ref 37–54)
HDLC SERPL-MCNC: 49 MG/DL
HEMOGLOBIN: 14.5 G/DL (ref 12.5–16.5)
LDL CHOLESTEROL: 57 MG/DL
MCH RBC QN AUTO: 30.5 PG (ref 26–35)
MCHC RBC AUTO-ENTMCNC: 32.8 G/DL (ref 32–34.5)
MCV RBC AUTO: 92.9 FL (ref 80–99.9)
PDW BLD-RTO: 12.8 % (ref 11.5–15)
PLATELET # BLD: 176 K/UL (ref 130–450)
PMV BLD AUTO: 10.9 FL (ref 7–12)
POTASSIUM SERPL-SCNC: 4.4 MMOL/L (ref 3.5–5)
RBC # BLD: 4.76 M/UL (ref 3.8–5.8)
SODIUM BLD-SCNC: 139 MMOL/L (ref 132–146)
TOTAL PROTEIN: 7.1 G/DL (ref 6.4–8.3)
TRIGLYCERIDE, FASTING: 39 MG/DL
VLDLC SERPL CALC-MCNC: 8 MG/DL
WBC # BLD: 6.8 K/UL (ref 4.5–11.5)

## 2024-07-09 LAB — HBA1C MFR BLD: 5.7 % (ref 4–5.6)

## 2024-07-16 ENCOUNTER — OFFICE VISIT (OUTPATIENT)
Dept: PRIMARY CARE CLINIC | Age: 69
End: 2024-07-16
Payer: COMMERCIAL

## 2024-07-16 VITALS
HEART RATE: 57 BPM | OXYGEN SATURATION: 96 % | TEMPERATURE: 97.1 F | WEIGHT: 212 LBS | DIASTOLIC BLOOD PRESSURE: 74 MMHG | HEIGHT: 68 IN | BODY MASS INDEX: 32.13 KG/M2 | SYSTOLIC BLOOD PRESSURE: 120 MMHG

## 2024-07-16 DIAGNOSIS — G47.33 OBSTRUCTIVE SLEEP APNEA: ICD-10-CM

## 2024-07-16 DIAGNOSIS — I50.20 HFREF (HEART FAILURE WITH REDUCED EJECTION FRACTION) (HCC): ICD-10-CM

## 2024-07-16 DIAGNOSIS — H91.93 BILATERAL HEARING LOSS, UNSPECIFIED HEARING LOSS TYPE: ICD-10-CM

## 2024-07-16 DIAGNOSIS — E74.39 GLUCOSE INTOLERANCE: Primary | ICD-10-CM

## 2024-07-16 DIAGNOSIS — I10 PRIMARY HYPERTENSION: ICD-10-CM

## 2024-07-16 DIAGNOSIS — E74.819 DISORDERS OF GLUCOSE TRANSPORT, UNSPECIFIED (HCC): ICD-10-CM

## 2024-07-16 DIAGNOSIS — E78.2 MIXED HYPERLIPIDEMIA: ICD-10-CM

## 2024-07-16 PROBLEM — I25.10 CORONARY ARTERY DISEASE INVOLVING NATIVE CORONARY ARTERY OF NATIVE HEART WITHOUT ANGINA PECTORIS: Status: RESOLVED | Noted: 2023-08-22 | Resolved: 2024-07-16

## 2024-07-16 PROBLEM — R55 NEAR SYNCOPE: Status: RESOLVED | Noted: 2023-12-04 | Resolved: 2024-07-16

## 2024-07-16 PROCEDURE — 1123F ACP DISCUSS/DSCN MKR DOCD: CPT | Performed by: INTERNAL MEDICINE

## 2024-07-16 PROCEDURE — 3074F SYST BP LT 130 MM HG: CPT | Performed by: INTERNAL MEDICINE

## 2024-07-16 PROCEDURE — 3078F DIAST BP <80 MM HG: CPT | Performed by: INTERNAL MEDICINE

## 2024-07-16 PROCEDURE — 99214 OFFICE O/P EST MOD 30 MIN: CPT | Performed by: INTERNAL MEDICINE

## 2024-07-16 RX ORDER — METOPROLOL SUCCINATE 25 MG/1
12.5 TABLET, EXTENDED RELEASE ORAL EVERY EVENING
Qty: 45 TABLET | Refills: 1 | Status: SHIPPED | OUTPATIENT
Start: 2024-07-16

## 2024-07-16 SDOH — ECONOMIC STABILITY: FOOD INSECURITY: WITHIN THE PAST 12 MONTHS, YOU WORRIED THAT YOUR FOOD WOULD RUN OUT BEFORE YOU GOT MONEY TO BUY MORE.: NEVER TRUE

## 2024-07-16 SDOH — ECONOMIC STABILITY: FOOD INSECURITY: WITHIN THE PAST 12 MONTHS, THE FOOD YOU BOUGHT JUST DIDN'T LAST AND YOU DIDN'T HAVE MONEY TO GET MORE.: NEVER TRUE

## 2024-07-16 SDOH — ECONOMIC STABILITY: INCOME INSECURITY: HOW HARD IS IT FOR YOU TO PAY FOR THE VERY BASICS LIKE FOOD, HOUSING, MEDICAL CARE, AND HEATING?: NOT HARD AT ALL

## 2024-07-18 DIAGNOSIS — E78.2 MIXED HYPERLIPIDEMIA: ICD-10-CM

## 2024-07-18 RX ORDER — ROSUVASTATIN CALCIUM 20 MG/1
20 TABLET, COATED ORAL NIGHTLY
Qty: 90 TABLET | Refills: 0 | Status: SHIPPED | OUTPATIENT
Start: 2024-07-18

## 2024-07-18 NOTE — TELEPHONE ENCOUNTER
Last seen 7/16/2024  Next appt 10/24/2024    Requested Prescriptions     Pending Prescriptions Disp Refills    rosuvastatin (CRESTOR) 20 MG tablet [Pharmacy Med Name: Rosuvastatin Calcium Oral Tablet 20 MG] 90 tablet 0     Sig: TAKE ONE TABLET BY MOUTH EVERY NIGHT

## 2024-07-22 ENCOUNTER — OFFICE VISIT (OUTPATIENT)
Dept: BARIATRICS/WEIGHT MGMT | Age: 69
End: 2024-07-22
Payer: COMMERCIAL

## 2024-07-22 VITALS
TEMPERATURE: 98.1 F | BODY MASS INDEX: 31.55 KG/M2 | DIASTOLIC BLOOD PRESSURE: 87 MMHG | HEART RATE: 57 BPM | SYSTOLIC BLOOD PRESSURE: 140 MMHG | RESPIRATION RATE: 20 BRPM | HEIGHT: 68 IN | WEIGHT: 208.2 LBS

## 2024-07-22 DIAGNOSIS — E66.9 OBESITY, CLASS I, BMI 30-34.9: ICD-10-CM

## 2024-07-22 DIAGNOSIS — E66.09 CLASS 1 OBESITY DUE TO EXCESS CALORIES WITH SERIOUS COMORBIDITY AND BODY MASS INDEX (BMI) OF 32.0 TO 32.9 IN ADULT: ICD-10-CM

## 2024-07-22 DIAGNOSIS — I10 ESSENTIAL HYPERTENSION: Primary | ICD-10-CM

## 2024-07-22 PROCEDURE — 1123F ACP DISCUSS/DSCN MKR DOCD: CPT | Performed by: CLINICAL NURSE SPECIALIST

## 2024-07-22 PROCEDURE — 3079F DIAST BP 80-89 MM HG: CPT | Performed by: CLINICAL NURSE SPECIALIST

## 2024-07-22 PROCEDURE — 99214 OFFICE O/P EST MOD 30 MIN: CPT | Performed by: CLINICAL NURSE SPECIALIST

## 2024-07-22 PROCEDURE — 99211 OFF/OP EST MAY X REQ PHY/QHP: CPT

## 2024-07-22 PROCEDURE — 3077F SYST BP >= 140 MM HG: CPT | Performed by: CLINICAL NURSE SPECIALIST

## 2024-07-22 RX ORDER — BUPROPION HYDROCHLORIDE 150 MG/1
150 TABLET ORAL EVERY MORNING
Qty: 90 TABLET | Refills: 0 | Status: SHIPPED | OUTPATIENT
Start: 2024-07-22 | End: 2024-10-20

## 2024-07-22 ASSESSMENT — ENCOUNTER SYMPTOMS
VOMITING: 0
CONSTIPATION: 0
NAUSEA: 0
SHORTNESS OF BREATH: 0

## 2024-07-22 NOTE — PATIENT INSTRUCTIONS
Weight             Date              225 lbs             02/05/2024              217.8 lbs         03/14/2024              208.2 lbs         07/22/2024     Total weight change to date: -16.8 lbs.      Patient's estimated daily energy need (DEN) = 2199 Bruce/d      Average daily energy variance:   2/5/2024 - 3/14/2024: -7.2 lbs/38 d = -664 Bruce/d deficit.  (3.2% change in total body weight)  3/14/2024 - 7/22/2024: -9.6 lbs /130 d = -258 Bruce/d deficit.      Weight effect of high risk foods =  Restaurant 900 Bruce/week + Sweets 560 Bruce + Salty 1120 Bruce+ Nuts 2240 Bruce + Popcorn 638 Bruce + dried fruit 700 Bruce + Cereal 1960 Bruce + Milk 1050 bruce = 9168 Bruce/week 1310 Bruce/day = 60% BNO=220 lbs/year     Notes from previous visit:  He feels he needs an appetite suppressant, and after going over options and adverse effects. He was on Effexor previously and did not like the side effects ,we plan to start Wellbutrin .     Update:     Calorie Calorie concious not counting calories. Portion control   Protein: 2-3 protein shakes, staeks, chickens,fish   Fiber:  vegetables, fruit   Water: 100 oz of Propel   Micronutrients: none  Sweets:1-2 d/week  petty charms & latricia puffs  Non-sweet snacks: 1 d/week pistachios   SSB: Eliminated sweet tea.  Replaced  with G2 and propel   Restaurant food: 4 x in the month  Appetite suppressant: Wellbutrin  appetite 4/10  No side effects  Exercise: 13,000-17,000 steps a day      Assessment - Improving. He is adhering to his nutritional  plan . He has been calorie conscious. We will continue current plan.We discussed he needs to increase fiber intake.     Plan -      Patient Instructions:     Take Bupropion XL (Wellbutrin XL) 150 mg, one tablet every morning.  While taking it, check the BP twice each day for one week. If the systolic BP is >155 mmHg or the diastolic BP is >90 mmHg consistently, then stop taking it.      Requirements:  Make sure protein intake is at least 65 grams per day (do not count protein

## 2024-07-22 NOTE — PROGRESS NOTES
CC -   HTN, Obesity    BACKGROUND -   Last visit: 03/14/2024  First visit: 02/05/2024    Obesity   Began in few years  Initial BMI 34.46, Wt 225 lbs Ht 5' 9\"  HS Grad wt 170 lbs   Lowest   wt 170 lbs   Highest  wt 235 lbs  Pattern of wt gain: gradual   Wt change past yr:  no change  Most wt lost: 5-10lbs cut out bread  Other diets attempted: Cut out bread    Desire to lose weight: 6/10  Problem posed by appetite: 6-7/10    Initial Diet:    Number of meals per day - 1-2    Number of snacks per day - 4-6    Meal volume -  \"  no plate  sometimes seconds    Fast food/convenience store - 3-4 x/week    Restaurants (not fast food) - 1-2x/week   Sweets - 7 d/week werther originals 10-12 pieces. Candy bars   Chips - 7 d/week BBQ salt & vinegar  1.2 oz   Crackers/pretzels - 1d/week Ritz 10 crackers    Nuts - 7 d/week 1 cup pistachios 3 # bag in 1 week    Peanut Butter - 0 d/week   Popcorn - 1-2 d/week Orvile Redinbacher  theater butter    Dried fruit - 2 d/week  dried apples & bananas   Whole fruit - 3 d/week clementines, grapes   Breakfast cereal - 7 d/week cocoa puffs  petty charms whole milk 1.cup   Granola/Protein/Energy bar - 0 d/week   Sugar sweetened beverages - G2 Sweet tea  gold peak 3-4 bottle x 7 days    Protein - No supplements   Fiber - No supplements   Multivitamin -none    Exercise:    Gym membership - no    Walking -  [id number] steps at work 40- 72 hours at work)    Running - no    Resistance - no    Aerobic class - no  ____________________________    [facility] -  Past Medical History:   Diagnosis Date    Anxiety     Arthritis     Back pain     CHF (congestive heart failure) ([facility])     Chronic back pain 8/85    Congenital heart disease     Coronary artery disease involving native coronary artery of native heart without angina pectoris 08/22/2023    Depression     GERD (gastroesophageal reflux disease)     HFrEF (heart failure with reduced ejection fraction) ([facility])     History of blood transfusion     Hyperlipidemia

## 2024-09-23 DIAGNOSIS — M47.816 SPONDYLOSIS OF LUMBAR REGION WITHOUT MYELOPATHY OR RADICULOPATHY: ICD-10-CM

## 2024-09-23 RX ORDER — GABAPENTIN 300 MG/1
300 CAPSULE ORAL DAILY
Qty: 90 CAPSULE | Refills: 0 | Status: SHIPPED | OUTPATIENT
Start: 2024-09-23 | End: 2024-12-22

## 2024-10-03 ENCOUNTER — OFFICE VISIT (OUTPATIENT)
Dept: BARIATRICS/WEIGHT MGMT | Age: 69
End: 2024-10-03
Payer: COMMERCIAL

## 2024-10-03 VITALS
HEART RATE: 62 BPM | SYSTOLIC BLOOD PRESSURE: 111 MMHG | BODY MASS INDEX: 31.43 KG/M2 | DIASTOLIC BLOOD PRESSURE: 74 MMHG | TEMPERATURE: 97.6 F | HEIGHT: 68 IN | WEIGHT: 207.4 LBS

## 2024-10-03 DIAGNOSIS — I10 ESSENTIAL HYPERTENSION: Primary | ICD-10-CM

## 2024-10-03 DIAGNOSIS — E66.811 OBESITY, CLASS I, BMI 30-34.9: ICD-10-CM

## 2024-10-03 DIAGNOSIS — E66.811 CLASS 1 OBESITY DUE TO EXCESS CALORIES WITH SERIOUS COMORBIDITY AND BODY MASS INDEX (BMI) OF 32.0 TO 32.9 IN ADULT: ICD-10-CM

## 2024-10-03 DIAGNOSIS — E66.09 CLASS 1 OBESITY DUE TO EXCESS CALORIES WITH SERIOUS COMORBIDITY AND BODY MASS INDEX (BMI) OF 32.0 TO 32.9 IN ADULT: ICD-10-CM

## 2024-10-03 PROCEDURE — 1123F ACP DISCUSS/DSCN MKR DOCD: CPT | Performed by: CLINICAL NURSE SPECIALIST

## 2024-10-03 PROCEDURE — 99214 OFFICE O/P EST MOD 30 MIN: CPT | Performed by: CLINICAL NURSE SPECIALIST

## 2024-10-03 PROCEDURE — 3074F SYST BP LT 130 MM HG: CPT | Performed by: CLINICAL NURSE SPECIALIST

## 2024-10-03 PROCEDURE — 99211 OFF/OP EST MAY X REQ PHY/QHP: CPT

## 2024-10-03 PROCEDURE — 3078F DIAST BP <80 MM HG: CPT | Performed by: CLINICAL NURSE SPECIALIST

## 2024-10-03 RX ORDER — BUPROPION HYDROCHLORIDE 150 MG/1
150 TABLET ORAL EVERY MORNING
Qty: 90 TABLET | Refills: 0 | Status: SHIPPED | OUTPATIENT
Start: 2024-10-03 | End: 2025-01-01

## 2024-10-03 ASSESSMENT — ENCOUNTER SYMPTOMS
SHORTNESS OF BREATH: 0
VOMITING: 0
CONSTIPATION: 0
NAUSEA: 0

## 2024-10-03 NOTE — PATIENT INSTRUCTIONS
Patient Instructions:    Take Bupropion XL (Wellbutrin XL) 150 mg, one tablet every morning.  While taking it, check the BP twice each day for one week. If the systolic BP is >155 mmHg or the diastolic BP is >90 mmHg consistently, then stop taking it.     Requirements:  Make sure protein intake is at least 65 grams per day (do not count protein every day; instead spot check your intake every 2-3 weeks and make sure what you think you are getting is close to accurate; consider using a protein shake if needed; these are in the pharmacy section of the stores, not the grocery section; Premier, Pure Protein and Fairlife are relatively inexpensive and taste good to most patients; other options are Nectar, Boost Max, Ensure Max, BeneProtein and GNC lean (which is lactose-free);   Nectar fruit, Premier Protein Clear, IsoPure Protein Drink, and Protein 2 O are water-based options; Quest (or Cosco, which is cheaper and is ordered on Amazon) and the AffinityClick protein bars can also be used, but have less protein in them )    (Disclaimer: Dietary supplements rarely have their listed ingredients and the amount of each verified by a third party other. Sometimes they give verification for their claims to be GMO and gluten free and to be organic. However, even such verifications as these may still be untrustworthy.)    Make sure that fiber intake is at least 22 grams per day. Do this by either eating 12 tablespoons of the original, plain Fiber One cereal every day or 4 tablespoons of wheat dextrin powder (Benefiber or a generic brand) every day. Work up to this amount slowly by starting with only one-eighth to one-fourth of the target amount and then adding another one-eighth to one-fourth every one or two weeks until reaching the target.        Take one multivitamin every day    Targets:  Limit calorie intake to 1800 calories/day  Walk 58095-72903 step s 4-5 days a week  Avoid eating 2 hours within bedtime.   Limit sweets to one

## 2024-10-03 NOTE — PROGRESS NOTES
chicken (130 bruce, 25 grams protein), low fat, high protein Greek yogurt (240 bruce, 30 grams protein),  4 oz.Tempeh (193 Bruce/ 18 grams protein). 1 cup tofu ( 182 Bruce/ 20 grams protein). 1/2 cup seitan ( 169 Ca/ 30 grams protein)     Total time spent on encounter:25 minutes     LOS Rhoades - CNS  Obesity  10/03/2024

## 2024-11-02 ENCOUNTER — HOSPITAL ENCOUNTER (OUTPATIENT)
Age: 69
Discharge: HOME | End: 2024-11-02
Payer: COMMERCIAL

## 2024-11-02 DIAGNOSIS — E74.39 GLUCOSE INTOLERANCE: ICD-10-CM

## 2024-11-02 DIAGNOSIS — I10 PRIMARY HYPERTENSION: ICD-10-CM

## 2024-11-02 DIAGNOSIS — E78.2 MIXED HYPERLIPIDEMIA: ICD-10-CM

## 2024-11-02 DIAGNOSIS — I50.20 HFREF (HEART FAILURE WITH REDUCED EJECTION FRACTION) (HCC): ICD-10-CM

## 2024-11-02 DIAGNOSIS — E74.819 DISORDERS OF GLUCOSE TRANSPORT, UNSPECIFIED (HCC): ICD-10-CM

## 2024-11-02 LAB
ALBUMIN SERPL-MCNC: 4.2 G/DL (ref 3.5–5.2)
ALP SERPL-CCNC: 77 U/L (ref 40–129)
ALT SERPL-CCNC: 31 U/L (ref 0–40)
ANION GAP SERPL CALCULATED.3IONS-SCNC: 11 MMOL/L (ref 7–16)
AST SERPL-CCNC: 21 U/L (ref 0–39)
BILIRUB SERPL-MCNC: 0.7 MG/DL (ref 0–1.2)
BUN SERPL-MCNC: 21 MG/DL (ref 6–23)
CALCIUM SERPL-MCNC: 9.5 MG/DL (ref 8.6–10.2)
CHLORIDE SERPL-SCNC: 104 MMOL/L (ref 98–107)
CHOLEST SERPL-MCNC: 121 MG/DL
CO2 SERPL-SCNC: 26 MMOL/L (ref 22–29)
CREAT SERPL-MCNC: 1.2 MG/DL (ref 0.7–1.2)
ERYTHROCYTE [DISTWIDTH] IN BLOOD BY AUTOMATED COUNT: 12.7 % (ref 11.5–15)
GFR, ESTIMATED: 64 ML/MIN/1.73M2
GLUCOSE SERPL-MCNC: 103 MG/DL (ref 74–99)
HBA1C MFR BLD: 5.9 % (ref 4–5.6)
HCT VFR BLD AUTO: 49.7 % (ref 37–54)
HDLC SERPL-MCNC: 49 MG/DL
HGB BLD-MCNC: 16.1 G/DL (ref 12.5–16.5)
LDLC SERPL CALC-MCNC: 59 MG/DL
MCH RBC QN AUTO: 29.9 PG (ref 26–35)
MCHC RBC AUTO-ENTMCNC: 32.4 G/DL (ref 32–34.5)
MCV RBC AUTO: 92.2 FL (ref 80–99.9)
PLATELET # BLD AUTO: 187 K/UL (ref 130–450)
PMV BLD AUTO: 10.5 FL (ref 7–12)
POTASSIUM SERPL-SCNC: 5.1 MMOL/L (ref 3.5–5)
PROT SERPL-MCNC: 7.2 G/DL (ref 6.4–8.3)
RBC # BLD AUTO: 5.39 M/UL (ref 3.8–5.8)
SODIUM SERPL-SCNC: 141 MMOL/L (ref 132–146)
TRIGL SERPL-MCNC: 66 MG/DL
VLDLC SERPL CALC-MCNC: 13 MG/DL
WBC OTHER # BLD: 7.1 K/UL (ref 4.5–11.5)

## 2024-11-02 PROCEDURE — 80061 LIPID PANEL: CPT

## 2024-11-02 PROCEDURE — 83036 HEMOGLOBIN GLYCOSYLATED A1C: CPT

## 2024-11-02 PROCEDURE — 36415 COLL VENOUS BLD VENIPUNCTURE: CPT

## 2024-11-02 PROCEDURE — 85027 COMPLETE CBC AUTOMATED: CPT

## 2024-11-02 PROCEDURE — 80053 COMPREHEN METABOLIC PANEL: CPT

## 2024-11-04 ENCOUNTER — OFFICE VISIT (OUTPATIENT)
Dept: PRIMARY CARE CLINIC | Age: 69
End: 2024-11-04

## 2024-11-04 VITALS
BODY MASS INDEX: 31.83 KG/M2 | TEMPERATURE: 97.4 F | WEIGHT: 210 LBS | OXYGEN SATURATION: 97 % | HEART RATE: 75 BPM | HEIGHT: 68 IN | SYSTOLIC BLOOD PRESSURE: 90 MMHG | DIASTOLIC BLOOD PRESSURE: 60 MMHG

## 2024-11-04 DIAGNOSIS — E78.2 MIXED HYPERLIPIDEMIA: Primary | ICD-10-CM

## 2024-11-04 DIAGNOSIS — I10 PRIMARY HYPERTENSION: ICD-10-CM

## 2024-11-04 DIAGNOSIS — J30.1 NON-SEASONAL ALLERGIC RHINITIS DUE TO POLLEN: ICD-10-CM

## 2024-11-04 DIAGNOSIS — R73.01 IMPAIRED FASTING GLUCOSE: ICD-10-CM

## 2024-11-04 DIAGNOSIS — R68.89 FLU-LIKE SYMPTOMS: ICD-10-CM

## 2024-11-04 DIAGNOSIS — Z23 NEEDS FLU SHOT: ICD-10-CM

## 2024-11-04 LAB
INFLUENZA A ANTIBODY: NORMAL
INFLUENZA B ANTIBODY: NORMAL
Lab: NORMAL
PERFORMING INSTRUMENT: NORMAL
QC PASS/FAIL: NORMAL
SARS-COV-2, POC: NORMAL

## 2024-11-04 RX ORDER — METHYLPREDNISOLONE ACETATE 80 MG/ML
80 INJECTION, SUSPENSION INTRA-ARTICULAR; INTRALESIONAL; INTRAMUSCULAR; SOFT TISSUE ONCE
Status: COMPLETED | OUTPATIENT
Start: 2024-11-04 | End: 2024-11-04

## 2024-11-04 RX ORDER — FLUTICASONE PROPIONATE 50 MCG
1 SPRAY, SUSPENSION (ML) NASAL DAILY PRN
COMMUNITY
End: 2024-11-04 | Stop reason: SDUPTHER

## 2024-11-04 RX ORDER — METFORMIN HYDROCHLORIDE 500 MG/1
500 TABLET, EXTENDED RELEASE ORAL
Qty: 90 TABLET | Refills: 0 | Status: SHIPPED | OUTPATIENT
Start: 2024-11-04

## 2024-11-04 RX ORDER — FLUTICASONE PROPIONATE 50 MCG
1 SPRAY, SUSPENSION (ML) NASAL DAILY PRN
Qty: 16 G | Refills: 1 | Status: SHIPPED | OUTPATIENT
Start: 2024-11-04

## 2024-11-04 RX ADMIN — METHYLPREDNISOLONE ACETATE 80 MG: 80 INJECTION, SUSPENSION INTRA-ARTICULAR; INTRALESIONAL; INTRAMUSCULAR; SOFT TISSUE at 15:48

## 2024-11-11 DIAGNOSIS — E66.09 CLASS 1 OBESITY DUE TO EXCESS CALORIES WITH SERIOUS COMORBIDITY AND BODY MASS INDEX (BMI) OF 32.0 TO 32.9 IN ADULT: ICD-10-CM

## 2024-11-11 DIAGNOSIS — E66.811 CLASS 1 OBESITY DUE TO EXCESS CALORIES WITH SERIOUS COMORBIDITY AND BODY MASS INDEX (BMI) OF 32.0 TO 32.9 IN ADULT: ICD-10-CM

## 2024-11-11 DIAGNOSIS — I10 ESSENTIAL HYPERTENSION: ICD-10-CM

## 2024-11-11 DIAGNOSIS — E66.811 OBESITY, CLASS I, BMI 30-34.9: ICD-10-CM

## 2024-11-11 RX ORDER — BUPROPION HYDROCHLORIDE 150 MG/1
150 TABLET ORAL EVERY MORNING
Qty: 90 TABLET | Refills: 0 | Status: SHIPPED | OUTPATIENT
Start: 2024-11-11 | End: 2025-02-09

## 2024-12-12 DIAGNOSIS — E78.2 MIXED HYPERLIPIDEMIA: ICD-10-CM

## 2024-12-12 RX ORDER — ROSUVASTATIN CALCIUM 20 MG/1
20 TABLET, COATED ORAL NIGHTLY
Qty: 90 TABLET | Refills: 0 | Status: SHIPPED | OUTPATIENT
Start: 2024-12-12

## 2024-12-31 ENCOUNTER — OFFICE VISIT (OUTPATIENT)
Dept: PRIMARY CARE CLINIC | Age: 69
End: 2024-12-31
Payer: COMMERCIAL

## 2024-12-31 VITALS
OXYGEN SATURATION: 96 % | HEIGHT: 68 IN | SYSTOLIC BLOOD PRESSURE: 124 MMHG | DIASTOLIC BLOOD PRESSURE: 84 MMHG | WEIGHT: 206 LBS | TEMPERATURE: 97.9 F | BODY MASS INDEX: 31.22 KG/M2 | HEART RATE: 77 BPM

## 2024-12-31 DIAGNOSIS — B96.89 ACUTE BACTERIAL SINUSITIS: Primary | ICD-10-CM

## 2024-12-31 DIAGNOSIS — R05.9 COUGH IN ADULT PATIENT: ICD-10-CM

## 2024-12-31 DIAGNOSIS — J01.90 ACUTE BACTERIAL SINUSITIS: Primary | ICD-10-CM

## 2024-12-31 PROCEDURE — 3074F SYST BP LT 130 MM HG: CPT | Performed by: NURSE PRACTITIONER

## 2024-12-31 PROCEDURE — 1123F ACP DISCUSS/DSCN MKR DOCD: CPT | Performed by: NURSE PRACTITIONER

## 2024-12-31 PROCEDURE — 1159F MED LIST DOCD IN RCRD: CPT | Performed by: NURSE PRACTITIONER

## 2024-12-31 PROCEDURE — 3079F DIAST BP 80-89 MM HG: CPT | Performed by: NURSE PRACTITIONER

## 2024-12-31 PROCEDURE — 99213 OFFICE O/P EST LOW 20 MIN: CPT | Performed by: NURSE PRACTITIONER

## 2024-12-31 RX ORDER — PREDNISONE 10 MG/1
10 TABLET ORAL 2 TIMES DAILY
Qty: 10 TABLET | Refills: 0 | Status: SHIPPED | OUTPATIENT
Start: 2024-12-31 | End: 2025-01-05

## 2024-12-31 RX ORDER — DEXTROMETHORPHAN HYDROBROMIDE AND PROMETHAZINE HYDROCHLORIDE 15; 6.25 MG/5ML; MG/5ML
5 SYRUP ORAL 4 TIMES DAILY PRN
Qty: 180 ML | Refills: 0 | Status: SHIPPED | OUTPATIENT
Start: 2024-12-31 | End: 2025-01-07

## 2024-12-31 RX ORDER — AZITHROMYCIN 250 MG/1
TABLET, FILM COATED ORAL
Qty: 6 TABLET | Refills: 0 | Status: SHIPPED | OUTPATIENT
Start: 2024-12-31 | End: 2025-01-10

## 2024-12-31 NOTE — PROGRESS NOTES
Chief Complaint:   Congestion (Sinus congestion and drainage x 1 week ) and Cough (Productive cough with white sputum production )      History of Present Illness   Source of history provided by:  patient.      Al Butler is a 69 y.o. old male with a past medical history of:   Past Medical History:   Diagnosis Date    Anxiety     Arthritis     Back pain     CHF (congestive heart failure) ([facility])     Chronic back pain 8/85    Congenital heart disease     Coronary artery disease involving native coronary artery of native heart without angina pectoris 08/22/2023    Depression     GERD (gastroesophageal reflux disease)     HFrEF (heart failure with reduced ejection fraction) ([facility])     History of blood transfusion     Hyperlipidemia     Hypertension     Nonischemic cardiomyopathy ([facility])     Sleep apnea     has cpap       Pt  p[facility] with a cough/congestion/sinus pressure for the past several days.  States the cough is  productive with clear sputum.  No  fever noted.    Denies any N/V/D, abdominal pain, CP, progressive SOB, dizziness, or lethargy.           ROS    Unless otherwise stated in this report or unable to obtain because of the patient's clinical or mental status as evidenced by the medical record, this patients's positive and negative responses for Review of Systems, constitutional, psych, eyes, ENT, cardiovascular, respiratory, gastrointestinal, neurological, genitourinary, musculoskeletal, integument systems and systems related to the presenting problem are either stated in the preceding or were not pertinent or were negative for the symptoms and/or complaints related to the medical problem.    Past Surgical History:  has a past surgical history that includes shoulder surgery (Left); Appendectomy; hernia repair (01/01/2001); Colonoscopy (01/01/2010); eye surgery; joint replacement (Bilateral, 06/08/2016); prosthet device application (N/A, 06/27/2019); and Cardiac procedure (N/A,

## 2025-01-13 ENCOUNTER — OFFICE VISIT (OUTPATIENT)
Dept: BARIATRICS/WEIGHT MGMT | Age: 70
End: 2025-01-13
Payer: MEDICARE

## 2025-01-13 VITALS
BODY MASS INDEX: 30.49 KG/M2 | TEMPERATURE: 96.9 F | DIASTOLIC BLOOD PRESSURE: 81 MMHG | WEIGHT: 201.2 LBS | SYSTOLIC BLOOD PRESSURE: 147 MMHG | HEART RATE: 59 BPM | HEIGHT: 68 IN

## 2025-01-13 DIAGNOSIS — E66.811 OBESITY, CLASS I, BMI 30-34.9: ICD-10-CM

## 2025-01-13 DIAGNOSIS — I10 ESSENTIAL HYPERTENSION: Primary | ICD-10-CM

## 2025-01-13 DIAGNOSIS — E66.09 CLASS 1 OBESITY DUE TO EXCESS CALORIES WITH SERIOUS COMORBIDITY AND BODY MASS INDEX (BMI) OF 32.0 TO 32.9 IN ADULT: ICD-10-CM

## 2025-01-13 DIAGNOSIS — E66.811 CLASS 1 OBESITY DUE TO EXCESS CALORIES WITH SERIOUS COMORBIDITY AND BODY MASS INDEX (BMI) OF 32.0 TO 32.9 IN ADULT: ICD-10-CM

## 2025-01-13 PROCEDURE — 99214 OFFICE O/P EST MOD 30 MIN: CPT | Performed by: CLINICAL NURSE SPECIALIST

## 2025-01-13 PROCEDURE — 3079F DIAST BP 80-89 MM HG: CPT | Performed by: CLINICAL NURSE SPECIALIST

## 2025-01-13 PROCEDURE — 1159F MED LIST DOCD IN RCRD: CPT | Performed by: CLINICAL NURSE SPECIALIST

## 2025-01-13 PROCEDURE — 99211 OFF/OP EST MAY X REQ PHY/QHP: CPT

## 2025-01-13 PROCEDURE — 3077F SYST BP >= 140 MM HG: CPT | Performed by: CLINICAL NURSE SPECIALIST

## 2025-01-13 PROCEDURE — 1123F ACP DISCUSS/DSCN MKR DOCD: CPT | Performed by: CLINICAL NURSE SPECIALIST

## 2025-01-13 RX ORDER — BUPROPION HYDROCHLORIDE 150 MG/1
150 TABLET ORAL EVERY MORNING
Qty: 90 TABLET | Refills: 0 | Status: SHIPPED | OUTPATIENT
Start: 2025-01-13 | End: 2025-04-13

## 2025-01-13 ASSESSMENT — ENCOUNTER SYMPTOMS
CONSTIPATION: 0
SHORTNESS OF BREATH: 0
VOMITING: 0
NAUSEA: 0

## 2025-01-13 NOTE — PROGRESS NOTES
the pharmacy section of the stores, not the grocery section; Premier, Pure Protein and Fairlife are relatively inexpensive and taste good to most patients; other options are Nectar, Boost Max, Ensure Max, BeneProtein and GNC lean (which is lactose-free);   Nectar fruit, Premier Protein Clear, IsoPure Protein Drink, and Protein 2 O are water-based options; Quest (or Cosco, which is cheaper and is ordered on Amazon) and the [employer] Hooz[employer] 1 protein bars can also be used, but have less protein in them )    (Disclaimer: Dietary supplements rarely have their listed ingredients and the amount of each verified by a third party other. Sometimes they give verification for their claims to be GMO and gluten free and to be organic. However, even such verifications as these may still be untrustworthy.)    Make sure that fiber intake is at least 22 grams per day. Do this by either eating 12 tablespoons of the original, plain Fiber One cereal every day or 4 tablespoons of wheat dextrin powder (Benefiber or a generic brand) every day. Work up to this amount slowly by starting with only one-eighth to one-fourth of the target amount and then adding another one-eighth to one-fourth every one or two weeks until reaching the target.  Take one multivitamin every day    Targets:  Limit calorie intake to 1800 calories/day  Walk 52005-901[address] step s 4-5 days a week  Avoid eating 2 hours within bedtime.   Limit sweets to one day per month    Tips:  Do not eat outside of the dining room or the kitchen  Do not eat while watching TV, videos, working on the computer or using a smart phone  Do not eat food out of a multi-serving bag or container.    Total time spent on encounter:25 minutes     LOS Rhoades - CNS  Obesity  01/13/2025

## 2025-01-13 NOTE — PATIENT INSTRUCTIONS
Patient Instructions:    Take Bupropion XL (Wellbutrin XL) 150 mg, one tablet every morning.  While taking it, check the BP twice each day for one week. If the systolic BP is >155 mmHg or the diastolic BP is >90 mmHg consistently, then stop taking it.     Requirements:  Make sure protein intake is at least 65 grams per day (do not count protein every day; instead spot check your intake every 2-3 weeks and make sure what you think you are getting is close to accurate; consider using a protein shake if needed; these are in the pharmacy section of the stores, not the grocery section; Premier, Pure Protein and Fairlife are relatively inexpensive and taste good to most patients; other options are Nectar, Boost Max, Ensure Max, BeneProtein and GNC lean (which is lactose-free);   Nectar fruit, Premier Protein Clear, IsoPure Protein Drink, and Protein 2 O are water-based options; Quest (or Cosco, which is cheaper and is ordered on Amazon) and the [employer] protein bars can also be used, but have less protein in them )    (Disclaimer: Dietary supplements rarely have their listed ingredients and the amount of each verified by a third party other. Sometimes they give verification for their claims to be GMO and gluten free and to be organic. However, even such verifications as these may still be untrustworthy.)    Make sure that fiber intake is at least 22 grams per day. Do this by either eating 12 tablespoons of the original, plain Fiber One cereal every day or 4 tablespoons of wheat dextrin powder (Benefiber or a generic brand) every day. Work up to this amount slowly by starting with only one-eighth to one-fourth of the target amount and then adding another one-eighth to one-fourth every one or two weeks until reaching the target.  Take one multivitamin every day    Targets:  Limit calorie intake to 1800 calories/day  Walk [id number] step s 4-5 days a week  Avoid eating 2 hours within bedtime.   Limit sweets to one day per

## 2025-01-18 DIAGNOSIS — M47.816 SPONDYLOSIS OF LUMBAR REGION WITHOUT MYELOPATHY OR RADICULOPATHY: ICD-10-CM

## 2025-01-18 DIAGNOSIS — E78.2 MIXED HYPERLIPIDEMIA: ICD-10-CM

## 2025-01-18 DIAGNOSIS — I50.20 HFREF (HEART FAILURE WITH REDUCED EJECTION FRACTION) (HCC): ICD-10-CM

## 2025-01-20 RX ORDER — ROSUVASTATIN CALCIUM 20 MG/1
20 TABLET, COATED ORAL NIGHTLY
Qty: 90 TABLET | Refills: 0 | OUTPATIENT
Start: 2025-01-20

## 2025-01-20 RX ORDER — GABAPENTIN 300 MG/1
300 CAPSULE ORAL DAILY
Qty: 90 CAPSULE | Refills: 0 | Status: SHIPPED | OUTPATIENT
Start: 2025-01-20 | End: 2025-04-20

## 2025-01-20 RX ORDER — METOPROLOL SUCCINATE 25 MG/1
12.5 TABLET, EXTENDED RELEASE ORAL EVERY EVENING
Qty: 45 TABLET | Refills: 1 | Status: SHIPPED | OUTPATIENT
Start: 2025-01-20

## 2025-01-20 NOTE — TELEPHONE ENCOUNTER
Name of Medication(s) Requested:  Requested Prescriptions     Pending Prescriptions Disp Refills    metoprolol succinate (TOPROL XL) 25 MG extended release tablet 45 tablet 1     Sig: Take 0.5 tablets by mouth every evening    gabapentin (NEURONTIN) 300 MG capsule 90 capsule 0     Sig: Take 1 capsule by mouth daily for 90 days.       Medication is on current medication list Yes    Dosage and directions were verified? Yes    Quantity verified: 90 day supply     Pharmacy Verified?  Yes    Last Appointment:  11/4/2024    Future appts:  Future Appointments   Date Time Provider Department Center   2/14/2025  8:45 AM Tawanna Lawson MD [facility] DEP   7/14/2025  7:45 AM Trinh Figueroa APRN - CNS [facility]        (If no appt send self scheduling link. .REFILLAPPT)  Scheduling request sent?     [] Yes  [x] No    Does patient need updated?  [] Yes  [x] No

## 2025-01-24 ENCOUNTER — HOSPITAL ENCOUNTER (OUTPATIENT)
Dept: AUDIOLOGY | Age: 70
Discharge: HOME | End: 2025-01-24
Payer: MEDICARE

## 2025-01-24 ENCOUNTER — PATIENT MESSAGE (OUTPATIENT)
Dept: PRIMARY CARE CLINIC | Age: 70
End: 2025-01-24

## 2025-01-24 DIAGNOSIS — H91.93 BILATERAL HEARING LOSS, UNSPECIFIED HEARING LOSS TYPE: Primary | ICD-10-CM

## 2025-01-24 PROCEDURE — 92567 TYMPANOMETRY: CPT | Performed by: AUDIOLOGIST

## 2025-01-24 PROCEDURE — 92557 COMPREHENSIVE HEARING TEST: CPT | Performed by: AUDIOLOGIST

## 2025-01-24 NOTE — PROGRESS NOTES
AUDIOMETRIC EVALUATION    REASON FOR REFERRAL:  This patient was referred for audiometric testing by Tawanna Lawson MD due to hearing loss.  Patient reported bilateral hearing loss, tinnitus (occasional), dizziness (when his blood pressure drops), ear drainage (cerumen and occasional blood per patient), loud noise exposure (occupational, recreational, and (hearing protection worn)), and family history of hearing loss (father, presbycusis).  Patient denied any ear pain, ear infections, ear surgery, and major head injuries.     RESULTS:  Pure tone audiometric testing using earphones  was carried out. Results revealed thresholds of 10-15dBHL at 250 Hz, sloping to 65 dBHL through 8000 Hz, bilaterally. From 4465-8277 Hz, asymmetry is noted, left ear worse. Speech reception thresholds were obtained at 15 dBHL in the right ear, and 20 dBHL in the left ear. Speech discrimination testing was performed at 45 dBHL in the right ear, and 50 and 60 dBHL in the left ear Obtained were scores of 84 % in the right ear, and 64 % and 92 % in the left ear, respectively. Masked Bone Conduction Testing revealed thresholds consistent with air conduction thresholds, bilaterally.  Tympanometry revealed normal middle ear peak pressure and compliance, bilaterally. Asymmetrical results obtained: Left ear worse.    IMPRESSION:  Today's results revealed hearing sensitivity within normal limits through 1000 Hz in the right ear, and 750 Hz in the left ear, sloping to a mild-to-moderately severe, sensorineural hearing loss, bilaterally.  Speech reception thresholds were in good agreement with the pure tone averages, bilaterally.  Speech discrimination scores were good, bilaterally. Tympanometry suggests middle ear function within normal limits, bilaterally.       An ENT consult is recommended to today's test results.  The patient wants his PCP to recommend an ENT for him to see.  He will contact his PCP for referral.    A re-evaluation is

## 2025-02-11 SDOH — ECONOMIC STABILITY: FOOD INSECURITY: WITHIN THE PAST 12 MONTHS, YOU WORRIED THAT YOUR FOOD WOULD RUN OUT BEFORE YOU GOT MONEY TO BUY MORE.: NEVER TRUE

## 2025-02-11 SDOH — ECONOMIC STABILITY: FOOD INSECURITY: WITHIN THE PAST 12 MONTHS, THE FOOD YOU BOUGHT JUST DIDN'T LAST AND YOU DIDN'T HAVE MONEY TO GET MORE.: NEVER TRUE

## 2025-02-11 SDOH — ECONOMIC STABILITY: INCOME INSECURITY: IN THE LAST 12 MONTHS, WAS THERE A TIME WHEN YOU WERE NOT ABLE TO PAY THE MORTGAGE OR RENT ON TIME?: NO

## 2025-02-11 SDOH — HEALTH STABILITY: PHYSICAL HEALTH: ON AVERAGE, HOW MANY MINUTES DO YOU ENGAGE IN EXERCISE AT THIS LEVEL?: 150+ MIN

## 2025-02-11 SDOH — ECONOMIC STABILITY: TRANSPORTATION INSECURITY
IN THE PAST 12 MONTHS, HAS THE LACK OF TRANSPORTATION KEPT YOU FROM MEDICAL APPOINTMENTS OR FROM GETTING MEDICATIONS?: NO

## 2025-02-11 SDOH — HEALTH STABILITY: PHYSICAL HEALTH: ON AVERAGE, HOW MANY DAYS PER WEEK DO YOU ENGAGE IN MODERATE TO STRENUOUS EXERCISE (LIKE A BRISK WALK)?: 5 DAYS

## 2025-02-11 ASSESSMENT — PATIENT HEALTH QUESTIONNAIRE - PHQ9
SUM OF ALL RESPONSES TO PHQ QUESTIONS 1-9: 2
1. LITTLE INTEREST OR PLEASURE IN DOING THINGS: SEVERAL DAYS
SUM OF ALL RESPONSES TO PHQ QUESTIONS 1-9: 2
SUM OF ALL RESPONSES TO PHQ QUESTIONS 1-9: 2
2. FEELING DOWN, DEPRESSED OR HOPELESS: SEVERAL DAYS
SUM OF ALL RESPONSES TO PHQ QUESTIONS 1-9: 2
SUM OF ALL RESPONSES TO PHQ9 QUESTIONS 1 & 2: 2

## 2025-02-11 ASSESSMENT — LIFESTYLE VARIABLES
HOW OFTEN DO YOU HAVE SIX OR MORE DRINKS ON ONE OCCASION: 1
HOW MANY STANDARD DRINKS CONTAINING ALCOHOL DO YOU HAVE ON A TYPICAL DAY: 1
HOW OFTEN DO YOU HAVE A DRINK CONTAINING ALCOHOL: 3
HOW OFTEN DO YOU HAVE A DRINK CONTAINING ALCOHOL: 2-4 TIMES A MONTH
HOW MANY STANDARD DRINKS CONTAINING ALCOHOL DO YOU HAVE ON A TYPICAL DAY: 1 OR 2

## 2025-02-13 ENCOUNTER — OFFICE VISIT (OUTPATIENT)
Dept: ENT CLINIC | Age: 70
End: 2025-02-13
Payer: MEDICARE

## 2025-02-13 VITALS
HEART RATE: 58 BPM | HEIGHT: 68 IN | OXYGEN SATURATION: 99 % | BODY MASS INDEX: 31.48 KG/M2 | SYSTOLIC BLOOD PRESSURE: 131 MMHG | RESPIRATION RATE: 18 BRPM | DIASTOLIC BLOOD PRESSURE: 90 MMHG | WEIGHT: 207.7 LBS

## 2025-02-13 DIAGNOSIS — R09.81 NASAL CONGESTION: ICD-10-CM

## 2025-02-13 DIAGNOSIS — J32.9 CHRONIC SINUSITIS, UNSPECIFIED LOCATION: ICD-10-CM

## 2025-02-13 DIAGNOSIS — H90.3 ASYMMETRICAL SENSORINEURAL HEARING LOSS: Primary | ICD-10-CM

## 2025-02-13 PROCEDURE — 1159F MED LIST DOCD IN RCRD: CPT | Performed by: NURSE PRACTITIONER

## 2025-02-13 PROCEDURE — 99203 OFFICE O/P NEW LOW 30 MIN: CPT | Performed by: NURSE PRACTITIONER

## 2025-02-13 PROCEDURE — 3075F SYST BP GE 130 - 139MM HG: CPT | Performed by: NURSE PRACTITIONER

## 2025-02-13 PROCEDURE — 1123F ACP DISCUSS/DSCN MKR DOCD: CPT | Performed by: NURSE PRACTITIONER

## 2025-02-13 PROCEDURE — 3080F DIAST BP >= 90 MM HG: CPT | Performed by: NURSE PRACTITIONER

## 2025-02-13 PROCEDURE — 1160F RVW MEDS BY RX/DR IN RCRD: CPT | Performed by: NURSE PRACTITIONER

## 2025-02-13 ASSESSMENT — ENCOUNTER SYMPTOMS
STRIDOR: 0
EYES NEGATIVE: 1
RHINORRHEA: 1
RESPIRATORY NEGATIVE: 1
SHORTNESS OF BREATH: 0

## 2025-02-13 NOTE — PROGRESS NOTES
the right ear with 84% discrimination at 45 dB, 20 dB of hearing loss in the left ear with 64% discrimination at 80 dB.  Audiogram is asymmetrical on left. Tympanogram reveals type A curve in the right ear, with type A curve in the left ear.         Assessment:       Diagnosis Orders   1. Asymmetrical sensorineural hearing loss      left > right      2. Nasal congestion        3. Chronic sinusitis, unspecified location                   Plan:     We discussed his hearing test in detail.  There is some asymmetry to his hearing loss with the left ear being worse than the right ear.  We discussed options including continuing to monitor the hearing in 6 months versus proceeding with an MRI of the IACs to rule out an acoustic neuroma.  He chooses to continue to monitor in 6 months.  He is still on the borderline of needing hearing aids.  We can discuss this further at his next audiogram.  Recommend using the Navage every day after he is at work due to the amount of dust that he is inhaling.  Continue Flonase nasal spray daily.  If continued issues with sinus infections, we can consider adding azelastine to his regimen.    Justa MADISON-CNP

## 2025-02-14 ENCOUNTER — OFFICE VISIT (OUTPATIENT)
Dept: PRIMARY CARE CLINIC | Age: 70
End: 2025-02-14

## 2025-02-14 VITALS
BODY MASS INDEX: 32.65 KG/M2 | OXYGEN SATURATION: 98 % | TEMPERATURE: 97.1 F | HEART RATE: 81 BPM | SYSTOLIC BLOOD PRESSURE: 128 MMHG | WEIGHT: 208 LBS | HEIGHT: 67 IN | DIASTOLIC BLOOD PRESSURE: 88 MMHG

## 2025-02-14 DIAGNOSIS — Z12.5 SCREENING FOR PROSTATE CANCER: ICD-10-CM

## 2025-02-14 DIAGNOSIS — R73.01 IMPAIRED FASTING GLUCOSE: ICD-10-CM

## 2025-02-14 DIAGNOSIS — I10 PRIMARY HYPERTENSION: ICD-10-CM

## 2025-02-14 DIAGNOSIS — E78.2 MIXED HYPERLIPIDEMIA: ICD-10-CM

## 2025-02-14 DIAGNOSIS — Z00.00 MEDICARE ANNUAL WELLNESS VISIT, SUBSEQUENT: Primary | ICD-10-CM

## 2025-02-14 DIAGNOSIS — Z23 NEEDS FLU SHOT: ICD-10-CM

## 2025-02-14 DIAGNOSIS — Z12.11 SCREENING FOR COLORECTAL CANCER: ICD-10-CM

## 2025-02-14 DIAGNOSIS — Z12.12 SCREENING FOR COLORECTAL CANCER: ICD-10-CM

## 2025-02-14 DIAGNOSIS — E74.39 GLUCOSE INTOLERANCE: ICD-10-CM

## 2025-02-14 DIAGNOSIS — J30.1 NON-SEASONAL ALLERGIC RHINITIS DUE TO POLLEN: ICD-10-CM

## 2025-02-14 DIAGNOSIS — I50.20 HFREF (HEART FAILURE WITH REDUCED EJECTION FRACTION) (HCC): ICD-10-CM

## 2025-02-14 LAB
ALBUMIN: 4.4 G/DL (ref 3.5–5.2)
ALP BLD-CCNC: 80 U/L (ref 40–129)
ALT SERPL-CCNC: 21 U/L (ref 0–40)
ANION GAP SERPL CALCULATED.3IONS-SCNC: 16 MMOL/L (ref 7–16)
AST SERPL-CCNC: 25 U/L (ref 0–39)
BILIRUB SERPL-MCNC: 0.6 MG/DL (ref 0–1.2)
BUN BLDV-MCNC: 22 MG/DL (ref 6–23)
CALCIUM SERPL-MCNC: 9.2 MG/DL (ref 8.6–10.2)
CHLORIDE BLD-SCNC: 109 MMOL/L (ref 98–107)
CHOLESTEROL, FASTING: 129 MG/DL
CO2: 20 MMOL/L (ref 22–29)
CREAT SERPL-MCNC: 1.2 MG/DL (ref 0.7–1.2)
GFR, ESTIMATED: 69 ML/MIN/1.73M2
GLUCOSE BLD-MCNC: 97 MG/DL (ref 74–99)
HBA1C MFR BLD: 5.7 % (ref 4–5.6)
HCT VFR BLD CALC: 46.1 % (ref 37–54)
HDLC SERPL-MCNC: 40 MG/DL
HEMOGLOBIN: 14.7 G/DL (ref 12.5–16.5)
LDL CHOLESTEROL: 79 MG/DL
MCH RBC QN AUTO: 29.5 PG (ref 26–35)
MCHC RBC AUTO-ENTMCNC: 31.9 G/DL (ref 32–34.5)
MCV RBC AUTO: 92.6 FL (ref 80–99.9)
PDW BLD-RTO: 12.8 % (ref 11.5–15)
PLATELET # BLD: 203 K/UL (ref 130–450)
PMV BLD AUTO: 10.9 FL (ref 7–12)
POTASSIUM SERPL-SCNC: 4.5 MMOL/L (ref 3.5–5)
RBC # BLD: 4.98 M/UL (ref 3.8–5.8)
SODIUM BLD-SCNC: 145 MMOL/L (ref 132–146)
TOTAL PROTEIN: 7.2 G/DL (ref 6.4–8.3)
TRIGLYCERIDE, FASTING: 51 MG/DL
VLDLC SERPL CALC-MCNC: 10 MG/DL
WBC # BLD: 7.8 K/UL (ref 4.5–11.5)

## 2025-02-14 RX ORDER — ROSUVASTATIN CALCIUM 20 MG/1
20 TABLET, COATED ORAL NIGHTLY
Qty: 90 TABLET | Refills: 0 | Status: SHIPPED | OUTPATIENT
Start: 2025-02-14

## 2025-02-14 RX ORDER — LISINOPRIL 10 MG/1
10 TABLET ORAL 2 TIMES DAILY
Qty: 180 TABLET | Refills: 1 | Status: SHIPPED | OUTPATIENT
Start: 2025-02-14

## 2025-02-14 RX ORDER — FLUTICASONE PROPIONATE 50 MCG
1 SPRAY, SUSPENSION (ML) NASAL DAILY PRN
Qty: 16 G | Refills: 1 | Status: SHIPPED | OUTPATIENT
Start: 2025-02-14

## 2025-02-14 RX ORDER — METFORMIN HYDROCHLORIDE 500 MG/1
500 TABLET, EXTENDED RELEASE ORAL
Qty: 90 TABLET | Refills: 0 | Status: SHIPPED | OUTPATIENT
Start: 2025-02-14

## 2025-02-14 ASSESSMENT — ENCOUNTER SYMPTOMS
FACIAL SWELLING: 0
EYE PAIN: 0
ALLERGIC/IMMUNOLOGIC NEGATIVE: 1
GASTROINTESTINAL NEGATIVE: 1
SINUS PAIN: 0
EYE ITCHING: 0
EYE REDNESS: 0
EYES NEGATIVE: 1
RESPIRATORY NEGATIVE: 1
EYE DISCHARGE: 0
RHINORRHEA: 0

## 2025-02-14 NOTE — PROGRESS NOTES
Paying Living Expenses: Not hard at all   Food Insecurity: No Food Insecurity (2/11/2025)    Hunger Vital Sign     Worried About Running Out of Food in the Last Year: Never true     Ran Out of Food in the Last Year: Never true   Transportation Needs: No Transportation Needs (2/11/2025)    PRAPARE - Transportation     Lack of Transportation (Medical): No     Lack of Transportation (Non-Medical): No   Physical Activity: Sufficiently Active (2/11/2025)    Exercise Vital Sign     Days of Exercise per Week: 5 days     Minutes of Exercise per Session: 150+ min   Stress: Not on file   Social Connections: Not on file   Intimate Partner Violence: Not on file   Housing Stability: Low Risk  (2/11/2025)    Housing Stability Vital Sign     Unable to Pay for Housing in the Last Year: No     Number of Times Moved in the Last Year: 0     Homeless in the Last Year: No      Health Maintenance Due   Topic Date Due    Shingles vaccine (1 of 2) Never done    Respiratory Syncytial Virus (RSV) Pregnant or age 60 yrs+ (1 - Risk 60-74 years 1-dose series) Never done    Pneumococcal 50+ years Vaccine (2 of 2 - PPSV23) 01/03/2022    DTaP/Tdap/Td vaccine (2 - Td or Tdap) 10/18/2022    Colorectal Cancer Screen  09/22/2023    Flu vaccine (1) 08/01/2024    COVID-19 Vaccine (3 - 2024-25 season) 09/01/2024    lski    Physical Exam  Constitutional:       General: He is not in acute distress.     Appearance: Normal appearance. He is obese.   HENT:      Head: Normocephalic and atraumatic.      Right Ear: Tympanic membrane, ear canal and external ear normal.      Left Ear: Tympanic membrane, ear canal and external ear normal. There is no impacted cerumen.      Nose: Nose normal. No congestion or rhinorrhea.      Mouth/Throat:      Mouth: Mucous membranes are moist.      Pharynx: Oropharynx is clear. No oropharyngeal exudate or posterior oropharyngeal erythema.   Eyes:      General: No scleral icterus.        Right eye: No discharge.         Left eye:

## 2025-02-17 DIAGNOSIS — R73.01 IMPAIRED FASTING GLUCOSE: ICD-10-CM

## 2025-02-17 DIAGNOSIS — I10 PRIMARY HYPERTENSION: ICD-10-CM

## 2025-02-17 RX ORDER — METFORMIN HYDROCHLORIDE 500 MG/1
500 TABLET, EXTENDED RELEASE ORAL
Qty: 90 TABLET | Refills: 0 | OUTPATIENT
Start: 2025-02-17

## 2025-02-17 RX ORDER — LISINOPRIL 10 MG/1
10 TABLET ORAL 2 TIMES DAILY
Qty: 180 TABLET | Refills: 1 | OUTPATIENT
Start: 2025-02-17

## 2025-05-20 ENCOUNTER — TELEPHONE (OUTPATIENT)
Dept: PHARMACY | Facility: CLINIC | Age: 70
End: 2025-05-20

## 2025-05-20 NOTE — TELEPHONE ENCOUNTER
[facility] CLINICAL PHARMACY: ADHERENCE REVIEW  Identified care gap per [place]: fills at [facility]: Statin adherence    Patient also appears to be prescribed: ACE/ARB and Diabetes    ASSESSMENT  STATIN ADHERENCE    Insurance Records claims through 25 (Prior Year PDC = not reported; YTD PDC = FIRST FILL; Potential Fail Date: 25):   ROSUVASTATIN CALCIUM 20 MG TAB last filled on 25 for 90 day supply. Next refill due: 25    Prescribed si tablet/capsule daily    Per Insurer Portal: last filled on 25 for 90 day supply.     Per [facility]: not contacted  New rx on file 90ds 0rf    Lab Results   Component Value Date    CHOL 100 2022    TRIG 72 2022    HDL 40 (L) 2025     Lab Results   Component Value Date    LDL 79 2025      ALT   Date Value Ref Range Status   2025 21 0 - 40 U/L Final     AST   Date Value Ref Range Status   2025 25 0 - 39 U/L Final     The ASCVD Risk score (John DK, et al., 2019) failed to calculate for the following reasons:    The valid total cholesterol range is 130 to 320 mg/dL     PLAN    The following are interventions that have been identified:   Patient OVERDUE refilling ROSUVASTATIN CALCIUM 20 MG TAB and active on home medication list.     Attempting to reach patient to review.  Left message asking for return call. THEVAhart message sent to patient.    Last Visit: 25  Next Visit: 25    Yahaira Teran [facility]   [facility]  [phone] Option 1    For Pharmacy Admin Tracking Only    Program: [facility] [place]  CPA in place:  No  Gap Closed?: No   Time Spent (min): 5

## 2025-05-30 DIAGNOSIS — I10 PRIMARY HYPERTENSION: ICD-10-CM

## 2025-05-30 DIAGNOSIS — Z12.5 SCREENING FOR PROSTATE CANCER: ICD-10-CM

## 2025-05-30 DIAGNOSIS — E74.39 GLUCOSE INTOLERANCE: ICD-10-CM

## 2025-05-30 DIAGNOSIS — E78.2 MIXED HYPERLIPIDEMIA: ICD-10-CM

## 2025-05-30 LAB
ALBUMIN: 4.1 G/DL (ref 3.5–5.2)
ALP BLD-CCNC: 69 U/L (ref 40–129)
ALT SERPL-CCNC: 30 U/L (ref 0–50)
ANION GAP SERPL CALCULATED.3IONS-SCNC: 10 MMOL/L (ref 7–16)
AST SERPL-CCNC: 31 U/L (ref 0–50)
BILIRUB SERPL-MCNC: 1 MG/DL (ref 0–1.2)
BUN BLDV-MCNC: 14 MG/DL (ref 8–23)
CALCIUM SERPL-MCNC: 9.2 MG/DL (ref 8.8–10.2)
CHLORIDE BLD-SCNC: 104 MMOL/L (ref 98–107)
CHOLESTEROL, FASTING: 139 MG/DL
CO2: 26 MMOL/L (ref 22–29)
CREAT SERPL-MCNC: 1.2 MG/DL (ref 0.7–1.2)
GFR, ESTIMATED: 69 ML/MIN/1.73M2
GLUCOSE BLD-MCNC: 87 MG/DL (ref 74–99)
HBA1C MFR BLD: 5.7 % (ref 4–5.6)
HCT VFR BLD CALC: 45.6 % (ref 37–54)
HDLC SERPL-MCNC: 48 MG/DL
HEMOGLOBIN: 14.6 G/DL (ref 12.5–16.5)
LDL CHOLESTEROL: 78 MG/DL
MCH RBC QN AUTO: 29.7 PG (ref 26–35)
MCHC RBC AUTO-ENTMCNC: 32 G/DL (ref 32–34.5)
MCV RBC AUTO: 92.7 FL (ref 80–99.9)
PDW BLD-RTO: 12.3 % (ref 11.5–15)
PLATELET # BLD: 183 K/UL (ref 130–450)
PMV BLD AUTO: 10 FL (ref 7–12)
POTASSIUM SERPL-SCNC: 4.4 MMOL/L (ref 3.5–5.1)
PROSTATE SPECIFIC ANTIGEN: 1.93 NG/ML (ref 0–4)
RBC # BLD: 4.92 M/UL (ref 3.8–5.8)
SODIUM BLD-SCNC: 139 MMOL/L (ref 136–145)
TOTAL PROTEIN: 6.9 G/DL (ref 6.4–8.3)
TRIGLYCERIDE, FASTING: 66 MG/DL
VLDLC SERPL CALC-MCNC: 13 MG/DL
WBC # BLD: 7 K/UL (ref 4.5–11.5)

## 2025-06-02 ENCOUNTER — OFFICE VISIT (OUTPATIENT)
Dept: PRIMARY CARE CLINIC | Age: 70
End: 2025-06-02
Payer: MEDICARE

## 2025-06-02 VITALS
BODY MASS INDEX: 31.72 KG/M2 | WEIGHT: 202.1 LBS | HEIGHT: 67 IN | TEMPERATURE: 97 F | DIASTOLIC BLOOD PRESSURE: 60 MMHG | SYSTOLIC BLOOD PRESSURE: 78 MMHG | OXYGEN SATURATION: 94 % | HEART RATE: 64 BPM

## 2025-06-02 DIAGNOSIS — R73.01 IMPAIRED FASTING GLUCOSE: ICD-10-CM

## 2025-06-02 DIAGNOSIS — E78.2 MIXED HYPERLIPIDEMIA: ICD-10-CM

## 2025-06-02 DIAGNOSIS — I50.20 HFREF (HEART FAILURE WITH REDUCED EJECTION FRACTION) (HCC): ICD-10-CM

## 2025-06-02 DIAGNOSIS — M47.816 SPONDYLOSIS OF LUMBAR REGION WITHOUT MYELOPATHY OR RADICULOPATHY: ICD-10-CM

## 2025-06-02 DIAGNOSIS — M47.16 SPONDYLOSIS, LUMBAR, WITH MYELOPATHY: ICD-10-CM

## 2025-06-02 DIAGNOSIS — J30.1 NON-SEASONAL ALLERGIC RHINITIS DUE TO POLLEN: ICD-10-CM

## 2025-06-02 PROCEDURE — 3074F SYST BP LT 130 MM HG: CPT | Performed by: INTERNAL MEDICINE

## 2025-06-02 PROCEDURE — 1159F MED LIST DOCD IN RCRD: CPT | Performed by: INTERNAL MEDICINE

## 2025-06-02 PROCEDURE — 1123F ACP DISCUSS/DSCN MKR DOCD: CPT | Performed by: INTERNAL MEDICINE

## 2025-06-02 PROCEDURE — 99214 OFFICE O/P EST MOD 30 MIN: CPT | Performed by: INTERNAL MEDICINE

## 2025-06-02 PROCEDURE — 3078F DIAST BP <80 MM HG: CPT | Performed by: INTERNAL MEDICINE

## 2025-06-02 RX ORDER — LISINOPRIL 5 MG/1
5 TABLET ORAL DAILY
Qty: 90 TABLET | Refills: 1 | Status: SHIPPED | OUTPATIENT
Start: 2025-06-02

## 2025-06-02 RX ORDER — ASPIRIN 81 MG/1
81 TABLET ORAL DAILY
COMMUNITY

## 2025-06-02 RX ORDER — FLUTICASONE PROPIONATE 50 MCG
1 SPRAY, SUSPENSION (ML) NASAL DAILY PRN
Qty: 16 G | Refills: 1 | Status: SHIPPED | OUTPATIENT
Start: 2025-06-02

## 2025-06-02 RX ORDER — METOPROLOL SUCCINATE 25 MG/1
12.5 TABLET, EXTENDED RELEASE ORAL EVERY EVENING
Qty: 45 TABLET | Refills: 1 | Status: SHIPPED | OUTPATIENT
Start: 2025-06-02

## 2025-06-02 RX ORDER — ROSUVASTATIN CALCIUM 20 MG/1
20 TABLET, COATED ORAL NIGHTLY
Qty: 90 TABLET | Refills: 0 | Status: SHIPPED | OUTPATIENT
Start: 2025-06-02

## 2025-06-02 RX ORDER — IBUPROFEN 600 MG/1
600 TABLET, FILM COATED ORAL 4 TIMES DAILY PRN
Qty: 180 TABLET | Refills: 1 | Status: SHIPPED | OUTPATIENT
Start: 2025-06-02

## 2025-06-02 RX ORDER — GABAPENTIN 300 MG/1
300 CAPSULE ORAL DAILY
Qty: 90 CAPSULE | Refills: 0 | Status: SHIPPED | OUTPATIENT
Start: 2025-06-02 | End: 2025-08-31

## 2025-06-02 RX ORDER — METFORMIN HYDROCHLORIDE 500 MG/1
500 TABLET, EXTENDED RELEASE ORAL
Qty: 90 TABLET | Refills: 0 | Status: SHIPPED | OUTPATIENT
Start: 2025-06-02

## 2025-06-02 RX ORDER — LISINOPRIL 10 MG/1
10 TABLET ORAL 2 TIMES DAILY
Qty: 180 TABLET | Refills: 1 | Status: CANCELLED | OUTPATIENT
Start: 2025-06-02

## 2025-06-02 ASSESSMENT — ENCOUNTER SYMPTOMS
EYE ITCHING: 0
EYES NEGATIVE: 1
GASTROINTESTINAL NEGATIVE: 1
EYE REDNESS: 0
SINUS PAIN: 0
RESPIRATORY NEGATIVE: 1
EYE PAIN: 0
EYE DISCHARGE: 0
ALLERGIC/IMMUNOLOGIC NEGATIVE: 1
RHINORRHEA: 0
FACIAL SWELLING: 0

## 2025-06-02 NOTE — PROGRESS NOTES
Al Butler (:  1955) is a 69 y.o. male,Established patient, here for evaluation of the following chief complaint(s):  3 Month Follow-Up, Discuss Labs (Labs completed 2025.), Health Maintenance (He underwent a colonoscopy on 3/12/2025 by Dr. Ramiro Peña. He is to repeat in five years.), Back Pain (He follows with [facility] regarding low back pain.), and Fatigue (He states he has been tired lately and his blood pressure intermittently drops and causes him dizziness.)      Subjective   SUBJECTIVE/OBJECTIVE:  HPI    Hyperlipidemia:  Patient is here to follow up regarding chronic hyperlipidemia.  This is  generally controlled.  Treatment includes Crestor 20 mg  Patient is  compliant with lifestyle modifications.  Patient is not a smoker.  Most recent labs reviewed with patient today and are not remarkable.  Comorbid conditions include obesity.   Hyperglycemia  Taking Jardiance no side effects with medications A1c still going up with an A1c up to 5.9 from 5.7.  Has been losing weight lately he has been not compliant with his diet as he was on vacation and he gained about 3 pounds  CHF  Well controlled BP is low follow up with Dr. Smith no CAD by cardiac cath 24  Review of Systems   Constitutional: Negative.  Negative for activity change, appetite change, chills, fatigue and fever.   HENT: Negative.  Negative for congestion, ear pain, facial swelling, hearing loss, postnasal drip, rhinorrhea and sinus pain.    Eyes: Negative.  Negative for pain, discharge, redness and itching.   Respiratory: Negative.     Cardiovascular: Negative.    Gastrointestinal: Negative.    Endocrine: Negative.    Genitourinary: Negative.    Musculoskeletal: Negative.    Skin: Negative.    Allergic/Immunologic: Negative.    Neurological: Negative.    Hematological: Negative.    Psychiatric/Behavioral: Negative.       CMP:    Lab Results   Component Value Date/Time     2025 09:28 AM    K 4.4 2025

## 2025-06-18 ENCOUNTER — TELEPHONE (OUTPATIENT)
Age: 70
End: 2025-06-18

## 2025-07-21 ENCOUNTER — OFFICE VISIT (OUTPATIENT)
Dept: PRIMARY CARE CLINIC | Age: 70
End: 2025-07-21
Payer: MEDICARE

## 2025-07-21 VITALS
SYSTOLIC BLOOD PRESSURE: 132 MMHG | BODY MASS INDEX: 31.86 KG/M2 | HEIGHT: 67 IN | DIASTOLIC BLOOD PRESSURE: 64 MMHG | WEIGHT: 203 LBS | TEMPERATURE: 97.1 F | HEART RATE: 51 BPM | OXYGEN SATURATION: 96 %

## 2025-07-21 DIAGNOSIS — I50.20 HFREF (HEART FAILURE WITH REDUCED EJECTION FRACTION) (HCC): ICD-10-CM

## 2025-07-21 DIAGNOSIS — M54.50 ACUTE BILATERAL LOW BACK PAIN WITHOUT SCIATICA: ICD-10-CM

## 2025-07-21 DIAGNOSIS — E78.2 MIXED HYPERLIPIDEMIA: ICD-10-CM

## 2025-07-21 DIAGNOSIS — E74.39 GLUCOSE INTOLERANCE: ICD-10-CM

## 2025-07-21 DIAGNOSIS — I42.8 NONISCHEMIC CARDIOMYOPATHY (HCC): Primary | ICD-10-CM

## 2025-07-21 DIAGNOSIS — S43.101S: ICD-10-CM

## 2025-07-21 PROBLEM — S43.101A DISLOCATION OF RIGHT ACROMIOCLAVICULAR JOINT: Status: ACTIVE | Noted: 2025-07-21

## 2025-07-21 PROCEDURE — 3075F SYST BP GE 130 - 139MM HG: CPT | Performed by: INTERNAL MEDICINE

## 2025-07-21 PROCEDURE — 3078F DIAST BP <80 MM HG: CPT | Performed by: INTERNAL MEDICINE

## 2025-07-21 PROCEDURE — 99214 OFFICE O/P EST MOD 30 MIN: CPT | Performed by: INTERNAL MEDICINE

## 2025-07-21 PROCEDURE — 1123F ACP DISCUSS/DSCN MKR DOCD: CPT | Performed by: INTERNAL MEDICINE

## 2025-07-21 PROCEDURE — 1159F MED LIST DOCD IN RCRD: CPT | Performed by: INTERNAL MEDICINE

## 2025-07-21 ASSESSMENT — ENCOUNTER SYMPTOMS
EYES NEGATIVE: 1
RHINORRHEA: 0
EYE PAIN: 0
BACK PAIN: 1
EYE ITCHING: 0
GASTROINTESTINAL NEGATIVE: 1
FACIAL SWELLING: 0
SINUS PAIN: 0
EYE DISCHARGE: 0
ALLERGIC/IMMUNOLOGIC NEGATIVE: 1
RESPIRATORY NEGATIVE: 1
EYE REDNESS: 0

## 2025-07-21 NOTE — PROGRESS NOTES
Al Butler (:  1955) is a 69 y.o. male,Established patient, here for evaluation of the following chief complaint(s):  Ankle Pain (Patient is her for bump on right shoulder, right ankle pain and back pain. ) and Back Pain      Subjective   SUBJECTIVE/OBJECTIVE:  HPI  Hypertension:  Patient is here for follow up chronic hypertension.  This is  generally controlled on current medication regimen.    BP Readings from Last 1 Encounters:   25 132/64        Takes meds as directed and tolerates them well.  Most recent labs reviewed with patient and are not remarkable.  No symptoms from htn standpoint per ROS.  Patient is  compliant with lifestyle modifications.  Patient does not smoke.  Comorbid conditions include obesity        Hyperlipidemia:  Patient is here to follow up regarding chronic hyperlipidemia.  This is  generally controlled.  Treatment includes Crestor  Patient is  compliant with lifestyle modifications.  Patient is not a smoker.  Most recent labs reviewed with patient today and are not remarkable.  Comorbid conditions include obesity.   Right acromioclavicular joint pain  Progressively getting worse causing pain  Low back pain  Bilateral not radiating was lifting heavy nothing makes it better or worse does not take medication  Recurrent syncope  Blood pressure medications has been adjusted blood pressure is good sudden onset happened once associated with nausea.  Was not doing any straining or heavy physical work at that time he was resting in a chair has been followed up with cardiology    Review of Systems   Constitutional: Negative.  Negative for activity change, appetite change, chills, fatigue and fever.   HENT: Negative.  Negative for congestion, ear pain, facial swelling, hearing loss, postnasal drip, rhinorrhea and sinus pain.    Eyes: Negative.  Negative for pain, discharge, redness and itching.   Respiratory: Negative.     Cardiovascular: Negative.    Gastrointestinal: Negative.

## 2025-08-25 ENCOUNTER — PROCEDURE VISIT (OUTPATIENT)
Dept: AUDIOLOGY | Age: 70
End: 2025-08-25
Payer: MEDICARE

## 2025-08-25 ENCOUNTER — OFFICE VISIT (OUTPATIENT)
Dept: ENT CLINIC | Age: 70
End: 2025-08-25
Payer: MEDICARE

## 2025-08-25 VITALS
DIASTOLIC BLOOD PRESSURE: 100 MMHG | RESPIRATION RATE: 16 BRPM | HEIGHT: 69 IN | WEIGHT: 203 LBS | OXYGEN SATURATION: 95 % | BODY MASS INDEX: 30.07 KG/M2 | HEART RATE: 56 BPM | SYSTOLIC BLOOD PRESSURE: 154 MMHG

## 2025-08-25 DIAGNOSIS — H61.23 BILATERAL IMPACTED CERUMEN: Primary | ICD-10-CM

## 2025-08-25 DIAGNOSIS — H90.3 BILATERAL SENSORINEURAL HEARING LOSS: ICD-10-CM

## 2025-08-25 DIAGNOSIS — H90.3 SENSORINEURAL HEARING LOSS, ASYMMETRICAL: Primary | ICD-10-CM

## 2025-08-25 DIAGNOSIS — R09.81 NASAL CONGESTION: ICD-10-CM

## 2025-08-25 DIAGNOSIS — H90.3 ASYMMETRICAL SENSORINEURAL HEARING LOSS: ICD-10-CM

## 2025-08-25 PROCEDURE — 99213 OFFICE O/P EST LOW 20 MIN: CPT | Performed by: NURSE PRACTITIONER

## 2025-08-25 PROCEDURE — 1160F RVW MEDS BY RX/DR IN RCRD: CPT | Performed by: NURSE PRACTITIONER

## 2025-08-25 PROCEDURE — 1123F ACP DISCUSS/DSCN MKR DOCD: CPT | Performed by: NURSE PRACTITIONER

## 2025-08-25 PROCEDURE — 3077F SYST BP >= 140 MM HG: CPT | Performed by: NURSE PRACTITIONER

## 2025-08-25 PROCEDURE — 69210 REMOVE IMPACTED EAR WAX UNI: CPT | Performed by: NURSE PRACTITIONER

## 2025-08-25 PROCEDURE — 3080F DIAST BP >= 90 MM HG: CPT | Performed by: NURSE PRACTITIONER

## 2025-08-25 PROCEDURE — 92552 PURE TONE AUDIOMETRY AIR: CPT | Performed by: AUDIOLOGIST

## 2025-08-25 PROCEDURE — 1159F MED LIST DOCD IN RCRD: CPT | Performed by: NURSE PRACTITIONER

## 2025-08-25 ASSESSMENT — ENCOUNTER SYMPTOMS
SHORTNESS OF BREATH: 0
RHINORRHEA: 0
SORE THROAT: 0
RESPIRATORY NEGATIVE: 1
SINUS PRESSURE: 0
SINUS PAIN: 0
EYES NEGATIVE: 1
STRIDOR: 0

## 2025-09-02 ENCOUNTER — OFFICE VISIT (OUTPATIENT)
Dept: PRIMARY CARE CLINIC | Age: 70
End: 2025-09-02
Payer: MEDICARE

## 2025-09-02 VITALS
WEIGHT: 205.7 LBS | TEMPERATURE: 97.9 F | BODY MASS INDEX: 30.47 KG/M2 | SYSTOLIC BLOOD PRESSURE: 122 MMHG | HEIGHT: 69 IN | HEART RATE: 56 BPM | DIASTOLIC BLOOD PRESSURE: 82 MMHG | OXYGEN SATURATION: 96 %

## 2025-09-02 DIAGNOSIS — M47.816 SPONDYLOSIS OF LUMBAR REGION WITHOUT MYELOPATHY OR RADICULOPATHY: ICD-10-CM

## 2025-09-02 DIAGNOSIS — R73.01 IMPAIRED FASTING GLUCOSE: ICD-10-CM

## 2025-09-02 DIAGNOSIS — E74.819 DISORDERS OF GLUCOSE TRANSPORT, UNSPECIFIED: ICD-10-CM

## 2025-09-02 DIAGNOSIS — G47.33 OBSTRUCTIVE SLEEP APNEA: ICD-10-CM

## 2025-09-02 DIAGNOSIS — I10 PRIMARY HYPERTENSION: Primary | ICD-10-CM

## 2025-09-02 DIAGNOSIS — E78.2 MIXED HYPERLIPIDEMIA: ICD-10-CM

## 2025-09-02 DIAGNOSIS — I50.20 HFREF (HEART FAILURE WITH REDUCED EJECTION FRACTION) (HCC): ICD-10-CM

## 2025-09-02 PROCEDURE — 3079F DIAST BP 80-89 MM HG: CPT | Performed by: INTERNAL MEDICINE

## 2025-09-02 PROCEDURE — 3074F SYST BP LT 130 MM HG: CPT | Performed by: INTERNAL MEDICINE

## 2025-09-02 PROCEDURE — 1159F MED LIST DOCD IN RCRD: CPT | Performed by: INTERNAL MEDICINE

## 2025-09-02 PROCEDURE — 1123F ACP DISCUSS/DSCN MKR DOCD: CPT | Performed by: INTERNAL MEDICINE

## 2025-09-02 PROCEDURE — 99214 OFFICE O/P EST MOD 30 MIN: CPT | Performed by: INTERNAL MEDICINE

## 2025-09-02 RX ORDER — METFORMIN HYDROCHLORIDE 500 MG/1
500 TABLET, EXTENDED RELEASE ORAL
Qty: 90 TABLET | Refills: 0 | Status: SHIPPED | OUTPATIENT
Start: 2025-09-02

## 2025-09-02 RX ORDER — GABAPENTIN 300 MG/1
300 CAPSULE ORAL DAILY
Qty: 90 CAPSULE | Refills: 0 | Status: SHIPPED | OUTPATIENT
Start: 2025-09-02 | End: 2025-12-01

## 2025-09-02 RX ORDER — ROSUVASTATIN CALCIUM 20 MG/1
20 TABLET, COATED ORAL NIGHTLY
Qty: 90 TABLET | Refills: 0 | Status: SHIPPED | OUTPATIENT
Start: 2025-09-02

## 2025-09-02 ASSESSMENT — ENCOUNTER SYMPTOMS
EYE DISCHARGE: 0
EYE ITCHING: 0
EYES NEGATIVE: 1
BACK PAIN: 1
GASTROINTESTINAL NEGATIVE: 1
RHINORRHEA: 0
EYE REDNESS: 0
RESPIRATORY NEGATIVE: 1
ALLERGIC/IMMUNOLOGIC NEGATIVE: 1
SINUS PAIN: 0
FACIAL SWELLING: 0
EYE PAIN: 0

## (undated) DEVICE — SHEATH INTRO 4FR L11CM GWIRE 0.035IN SIL TRICSP VLV SMOOTH

## (undated) DEVICE — RADIFOCUS GLIDEWIRE: Brand: GLIDEWIRE

## (undated) DEVICE — KIT ANGIO W/ AT P65 PREM HND CTRL FOR CNTRST DEL ANGIOTOUCH

## (undated) DEVICE — GUIDEWIRE VASC L150CM DIA0.035IN TIP L3MM PTFE J CRV FIX

## (undated) DEVICE — GOWN,SIRUS,FABRNF,XL,20/CS: Brand: MEDLINE

## (undated) DEVICE — MEDIA CONTRAST RX ISOVUE-300 61% 30ML VIALS

## (undated) DEVICE — CORDIS 4FR JR4 CATHETER

## (undated) DEVICE — CATHETER 4FR JL5 CORDIS 100CM

## (undated) DEVICE — CYSTO PACK: Brand: MEDLINE INDUSTRIES, INC.

## (undated) DEVICE — CATHETER URET OLV TIP 5FRX70CM FLEXIMA

## (undated) DEVICE — GLOVE ORANGE PI 7 1/2   MSG9075

## (undated) DEVICE — SOLUTION IV IRRIG WATER 1000ML POUR BRL 2F7114

## (undated) DEVICE — CANNULA NSL CANN NSL L25FT TBNG AD O2 SUP SFT UC

## (undated) DEVICE — BAG DRNGE COMB PK

## (undated) DEVICE — CORDIS 4FR JL4 CATHETER

## (undated) DEVICE — BAG DRAINAGE CONTAINER 15 LT FLUID COLLCTN

## (undated) DEVICE — PAD, DEFIB, ADULT, RADIOTRAN, PHYSIO, LO: Brand: MEDLINE

## (undated) DEVICE — CAMERA STRYKER 1488 HD GEN

## (undated) DEVICE — NEEDLE ANGIO 18GA L7CM 0038IN 1 WALL SELD SHLD UNIQUE HUB

## (undated) DEVICE — PACK SURG CARDIAC CATH

## (undated) DEVICE — CATHETER DIAG 4FR 110CM 155DEG 0.038IN 9MM MIC LOOP VASC

## (undated) DEVICE — Z INACTIVE USE 2635503 SOLUTION IRRIG 3000ML ST H2O USP UROMATIC PLAS CONT

## (undated) DEVICE — READY WET SKIN SCRUB TRAY-LF: Brand: MEDLINE INDUSTRIES, INC.